# Patient Record
Sex: MALE | Race: WHITE | Employment: FULL TIME | URBAN - METROPOLITAN AREA
[De-identification: names, ages, dates, MRNs, and addresses within clinical notes are randomized per-mention and may not be internally consistent; named-entity substitution may affect disease eponyms.]

---

## 2022-05-14 ENCOUNTER — APPOINTMENT (OUTPATIENT)
Dept: CT IMAGING | Age: 52
End: 2022-05-14
Payer: COMMERCIAL

## 2022-05-14 ENCOUNTER — HOSPITAL ENCOUNTER (EMERGENCY)
Age: 52
Discharge: HOME | End: 2022-05-14
Payer: COMMERCIAL

## 2022-05-14 VITALS
RESPIRATION RATE: 15 BRPM | BODY MASS INDEX: 26.68 KG/M2 | TEMPERATURE: 98 F | HEIGHT: 72 IN | HEART RATE: 72 BPM | SYSTOLIC BLOOD PRESSURE: 153 MMHG | WEIGHT: 197 LBS | OXYGEN SATURATION: 97 % | DIASTOLIC BLOOD PRESSURE: 104 MMHG

## 2022-05-14 DIAGNOSIS — S06.0X1A CONCUSSION WITH LOSS OF CONSCIOUSNESS OF 30 MINUTES OR LESS, INITIAL ENCOUNTER: ICD-10-CM

## 2022-05-14 DIAGNOSIS — S09.90XA INJURY OF HEAD, INITIAL ENCOUNTER: Primary | ICD-10-CM

## 2022-05-14 PROCEDURE — 70450 CT HEAD/BRAIN W/O DYE: CPT

## 2022-05-14 PROCEDURE — 99284 EMERGENCY DEPT VISIT MOD MDM: CPT

## 2022-05-14 PROCEDURE — 72125 CT NECK SPINE W/O DYE: CPT

## 2022-05-14 ASSESSMENT — PAIN - FUNCTIONAL ASSESSMENT: PAIN_FUNCTIONAL_ASSESSMENT: NONE - DENIES PAIN

## 2022-05-14 NOTE — Clinical Note
Merary Grey was seen and treated in our emergency department on 5/14/2022. He may return to work on 05/18/2022. If you have any questions or concerns, please don't hesitate to call.       Lupe Samuel PA-C

## 2022-05-14 NOTE — ED PROVIDER NOTES
[facility]                                                                                                                                      Department of Emergency Medicine   ED  Provider Note  Admit Date/RoomTime: 5/14/2022  1:24 PM  ED Room: 35/        HPI:  5/14/22,   Time: 1:14 PM EDT         Cezar Wilkinson is a 45 y.o. male presenting to the ED for fall/head injury, beginning 1 week ago. The complaint has been persistent, moderate in severity, and worsened by nothing. States he tripped while walking. Reports he hit his head. No LOC. Uncertain if he lost consciousness. States he is not normally on blood thinners. Reports since the incident he has had issues with confusion, difficulty using computer and general sense of fogginess. States last night he left work and twice put his work into GPS and drove back to work. Just feels that something isn't right. Denies significant pain. No weakness or vomiting. Pt is otherwise healthy. ROS:     Constitutional: Negative for fever and chills  HENT: Negative for ear pain, sore throat and sinus pressure  Eyes: Negative for pain, discharge and redness  Respiratory:  Negative for shortness of breath, cough and wheezing  Cardiovascular: Negative for CP, edema or palpitations  Gastrointestinal: Negative for nausea, vomiting, diarrhea and abdominal distention  Genitourinary: Negative for dysuria and frequency  Musculoskeletal: Negative for back pain and arthralgia  Skin: Negative for rash and wound  Neurological: Negative for weakness and headaches  Hematological: Negative for adenopathy    All other systems reviewed and are negative      -------------------------------- PAST HISTORY ----------------------------------  Past Medical History:  has no past medical history on file. Past Surgical History:  has no past surgical history on file. Social History:      Family History: family history is not on file.      The patients home medications have been reviewed. Allergies: Patient has no known allergies. --------------------------------- RESULTS ------------------------------------------  All laboratory and radiology results have been personally reviewed by myself   LABS:  No results found for this visit on 05/14/22. RADIOLOGY:  Interpreted by Radiologist.  [address] [place] [address]   Final Result   No acute intracranial abnormality. CT CERVICAL SPINE WO CONTRAST   Final Result   No acute abnormality of the cervical spine.             ----------------- NURSING NOTES AND VITALS REVIEWED ---------------   The nursing notes within the ED encounter and vital signs as below have been reviewed. BP (!) 153/104   Pulse 72   Temp 98 °F (36.7 °C)   Resp 15   Ht 6' (1.829 m)   Wt 197 lb (89.4 kg)   SpO2 97%   BMI 26.72 kg/m²   Oxygen Saturation Interpretation: Normal      --------------------------------PHYSICAL EXAM------------------------------------      Constitutional/General: Alert and oriented x3, well appearing, non toxic in NAD  Head: NC/AT. No visible trauma, swelling or bruising seen  Eyes: PERRL, EOMI  Mouth: Oropharynx clear, handling secretions, no trismus  Neck: Supple, full ROM, no meningeal signs  Pulmonary: Lungs clear to auscultation bilaterally, no wheezes, rales, or rhonchi. Not in respiratory distress  Cardiovascular:  Regular rate and rhythm, no murmurs, gallops, or rubs. 2+ distal pulses  Abdomen: Soft, + BS. No distension. Nontender. No palpable rigidity, rebound or guarding  Extremities: Moves all extremities x 4. Warm and well perfused  Skin: warm and dry without rash  Neurologic: GCS 15,  Intact. No focal deficits  Psych: Normal Affect      ------------------------ ED COURSE/MEDICAL DECISION MAKING----------------------  Medications - No data to display      Medical Decision Making:    CT head and cervical spine both normal.   No acute bleed or fracture. Likely concussion. Discussed treatment. Given slip for work to be out for next few days. Pt dealing with grief/loss of his father as well. Discussed treatment plan. Counseling: The emergency provider has spoken with the patient and discussed todays results, in addition to providing specific details for the plan of care and counseling regarding the diagnosis and prognosis. Questions are answered at this time and they are agreeable with the plan.      ------------------------ IMPRESSION AND DISPOSITION -------------------------------    IMPRESSION  1. Injury of head, initial encounter    2.  Concussion with loss of consciousness of 30 minutes or less, initial encounter        DISPOSITION  Disposition: Discharge to home  Patient condition is stable                   Basil Flores PA-C  05/14/22 1442

## 2022-05-14 NOTE — Clinical Note
Khris Hebert was seen and treated in our emergency department on 5/14/2022. He may return to work on 05/18/2022. If you have any questions or concerns, please don't hesitate to call.       Chicho Huynh PA-C

## 2022-07-01 ENCOUNTER — TELEPHONE (OUTPATIENT)
Dept: SPORTS MEDICINE | Age: 52
End: 2022-07-01

## 2022-07-07 ENCOUNTER — HOSPITAL ENCOUNTER (OUTPATIENT)
Dept: MRI IMAGING | Age: 52
Discharge: HOME | End: 2022-07-09
Payer: COMMERCIAL

## 2022-07-07 DIAGNOSIS — S06.0X1D CONCUSSION WITH LOSS OF CONSCIOUSNESS OF 30 MINUTES OR LESS, SUBSEQUENT ENCOUNTER: ICD-10-CM

## 2022-07-07 PROCEDURE — 70551 MRI BRAIN STEM W/O DYE: CPT

## 2022-07-28 ENCOUNTER — HOSPITAL ENCOUNTER (OUTPATIENT)
Dept: PHYSICAL THERAPY | Age: 52
Setting detail: THERAPIES SERIES
Discharge: HOME | End: 2022-07-28
Payer: COMMERCIAL

## 2022-07-28 ENCOUNTER — HOSPITAL ENCOUNTER (EMERGENCY)
Age: 52
Discharge: HOME | End: 2022-07-28
Attending: EMERGENCY MEDICINE
Payer: COMMERCIAL

## 2022-07-28 VITALS
DIASTOLIC BLOOD PRESSURE: 86 MMHG | HEART RATE: 70 BPM | RESPIRATION RATE: 18 BRPM | OXYGEN SATURATION: 97 % | TEMPERATURE: 98.6 F | SYSTOLIC BLOOD PRESSURE: 133 MMHG

## 2022-07-28 DIAGNOSIS — K62.9 RECTAL LESION: ICD-10-CM

## 2022-07-28 DIAGNOSIS — A53.9 ACQUIRED SYPHILIS: Primary | ICD-10-CM

## 2022-07-28 LAB
BASOPHILS ABSOLUTE: 0 E9/L (ref 0–0.2)
BASOPHILS RELATIVE PERCENT: 0.9 % (ref 0–2)
EOSINOPHILS ABSOLUTE: 0.06 E9/L (ref 0.05–0.5)
EOSINOPHILS RELATIVE PERCENT: 0.9 % (ref 0–6)
HCT VFR BLD CALC: 48.3 % (ref 37–54)
HEMOGLOBIN: 16.5 G/DL (ref 12.5–16.5)
LYMPHOCYTES ABSOLUTE: 2.55 E9/L (ref 1.5–4)
LYMPHOCYTES RELATIVE PERCENT: 38.4 % (ref 20–42)
MCH RBC QN AUTO: 31.6 PG (ref 26–35)
MCHC RBC AUTO-ENTMCNC: 34.2 % (ref 32–34.5)
MCV RBC AUTO: 92.5 FL (ref 80–99.9)
MONOCYTES ABSOLUTE: 0.8 E9/L (ref 0.1–0.95)
MONOCYTES RELATIVE PERCENT: 11.6 % (ref 2–12)
MYELOCYTE PERCENT: 0.9 % (ref 0–0)
NEUTROPHILS ABSOLUTE: 3.28 E9/L (ref 1.8–7.3)
NEUTROPHILS RELATIVE PERCENT: 48.2 % (ref 43–80)
OVALOCYTES: NORMAL
PDW BLD-RTO: 13.8 FL (ref 11.5–15)
PLATELET # BLD: 204 E9/L (ref 130–450)
PMV BLD AUTO: 9.8 FL (ref 7–12)
POIKILOCYTES: NORMAL
POLYCHROMASIA: NORMAL
RBC # BLD: 5.22 E12/L (ref 3.8–5.8)
REASON FOR REJECTION: NORMAL
REJECTED TEST: NORMAL
WBC # BLD: 6.7 E9/L (ref 4.5–11.5)

## 2022-07-28 PROCEDURE — 99284 EMERGENCY DEPT VISIT MOD MDM: CPT

## 2022-07-28 PROCEDURE — 96372 THER/PROPH/DIAG INJ SC/IM: CPT

## 2022-07-28 PROCEDURE — 97161 PT EVAL LOW COMPLEX 20 MIN: CPT

## 2022-07-28 PROCEDURE — 85025 COMPLETE CBC W/AUTO DIFF WBC: CPT

## 2022-07-28 PROCEDURE — 6360000002 HC RX W HCPCS: Performed by: EMERGENCY MEDICINE

## 2022-07-28 RX ORDER — BUPROPION HYDROCHLORIDE 300 MG/1
TABLET ORAL
COMMUNITY
Start: 2022-07-06 | End: 2022-08-12

## 2022-07-28 RX ORDER — ALPRAZOLAM 1 MG/1
TABLET ORAL
COMMUNITY
Start: 2022-07-06

## 2022-07-28 RX ORDER — TESTOSTERONE CYPIONATE 200 MG/ML
INJECTION INTRAMUSCULAR
COMMUNITY
Start: 2022-07-06

## 2022-07-28 RX ORDER — HYDROXYZINE PAMOATE 50 MG/1
CAPSULE ORAL
COMMUNITY
Start: 2022-06-28 | End: 2022-08-17

## 2022-07-28 RX ADMIN — PENICILLIN G BENZATHINE 2.4 MILLION UNITS: 1200000 INJECTION, SUSPENSION INTRAMUSCULAR at 17:56

## 2022-07-28 ASSESSMENT — ENCOUNTER SYMPTOMS
TROUBLE SWALLOWING: 0
SHORTNESS OF BREATH: 0
PHOTOPHOBIA: 0
ABDOMINAL PAIN: 0
NAUSEA: 0
VOICE CHANGE: 0
COUGH: 0
DIARRHEA: 0
VOMITING: 0

## 2022-07-28 NOTE — PROGRESS NOTES
experiences spells of vertigo?: No  Patient experiences disequilibrium?: Yes  Disequilibrium is?: Worse with fatigue, Occurs when walking  Symptoms worse with: Movement of the visual environment, Complex visual environments  Associated hearing/ear symptoms: Yes (Reports mild tinnitus)  Hearing/Ear symptoms: Tinnitus  Which Side?: Both Sides  Any recent Illness or Infections?: No  Any recent accidents or head trauma?: Yes  Describe: Pt with fall and head injury with LOC on 5/7/2022. Any history of migraines or headaches?: No  Additional Pertinent Hx: Pt fell while walking fast at work. 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 Pt reports having difficulty with visual focusing. Pt reports having guarding movements with the head with turns. Auditory History:        Vision History:   Do you wear glasses/contacts: Yes             Learning/Language: Learning  Does the patient/guardian have any barriers to learning?: No barriers  Will there be a co-learner?: No  What is the preferred language of the patient/guardian?: English  Is an  required?: No  How does the patient/guardian prefer to learn new concepts?: Listening     Pain Screening    Pain Screening  Patient Currently in Pain: Denies (Typically takes Tylenol for headache pain)    Functional Status         Social History:    Social History  Lives With: Other (comment) (Pt recently moved here from [place] and is staying with his mother for the time being. Pt states this is not permanent.)  Type of Home: Apartment  Home Access: Level entry  Bathroom Toilet: Standard    Occupation/Interests:   Occupation: Other(comment) (Pt currenly not working since injury. Pt is unsure if able to return to previous employer.)  Type of Occupation: Restaurant/Management    Prior Level of Function:     Independent        Current Level of Function:     Pt reports impaired endurance and focus since fall.          Active : each symptom according to a 7-point likert scale ranging from 0-6. Higher scores indicate a higher severity of post-concussive symptoms. The greatest possible score is 132 and the lowest possible score is 0. Total Score out of 132 (Higher score = more severe problems). Additional Finding(s) (if applicable):       ASSESSMENT     Impression: Assessment: Pt with concussive symptoms that appear to be staying the same for the past several weeks. Pt does report multiple life changes that have caused stress and anxiety to him. Body Structures, Functions, Activity Limitations Requiring Skilled Therapeutic Intervention: Decreased tolerance to work activity, Decreased endurance, Decreased vision/visual deficit    Statement of Medical Necessity: Physical Therapy is both indicated and medically necessary as outlined in the POC to increase the likelihood of meeting the functionally related goals stated below.      Patient's Activity Tolerance: Other (comment) (Pt with reported increased anxiety that hindered activity tolerance.)      Patient's rehabilitation potential/prognosis is considered to be: Good    Factors which may impact rehabilitation potential include:          GOALS     Patient Goal(s):    Short Term Goals Completed by 2 weeks Goal Status   Pt to report 50% less visual deficits with mobility New   Pt to demonstrate 50% improved balance with tandem stance New    Pt to demonstrate 50% improved comprehension with daily tasks New    Pt to decrease score of PCSS to <40               Long Term Goals Completed by 4 weeks Goal Status   Pt to report no visual deficits with mobility New   Pt to demonstrate normal balance with high level balance tasks New    Pt to report no foggy/fuzziness with daily activities New    Pt to decrease score of PCSS to <10                TREATMENT PLAN            Pt. actively involved in establishing Plan of Care and Goals: Yes  Patient/ Caregiver education and instruction: Pt educated on post concussive expectations. Treatment may include any combination of the following: Balance training, Cognitive/Perceptual training, Endurance training, Vestibular rehab     Frequency / Duration:  Patient to be seen 2-3x week for 4-6 weeks weeks      Eval Complexity: Low complexity        PT Treatment Completed:  N/A - Evaluation Only    Therapy Time  Individual Time In: 1400      Individual Time Out: 5908   Minutes: 39           Therapist Signature: Rosina Bravo PT    Date: 3/69/9396     I certify that the above Therapy Services are being furnished while the patient is under my care. I agree with the treatment plan and certify that this therapy is necessary. Physician's Signature:  ___________________________   Date:_______                                                                   Jay Nichols MD        Physician Comments: _______________________________________________    Please sign and return to [facility]  Please fax to the location listed below.  Surjit Nazario for this referral!    [address] [facility]  [facility] [address]  Dept: [phone]  Fax: [phone]         POC NOTE

## 2022-07-28 NOTE — ED PROVIDER NOTES
HPI   Patient is a MSM 59-year-old male with past medical history of HIV with no detectable viral load and normal CD4 count on Biktarvy presented emergency department due to concerns for monkey pox. Patient states that he had known exposure to an individual that has symptoms of the disease. He reports that he was traveling to [place] from [place] where he lives for work. Apparently, there is a big break out of Monkey pox in that region of [place]. Patient does admit to receptive anal intercourse with an individual who reportedly has symptoms of monkey pox. This individual was exposed to another person who was confirmed positive for monkey pox. In terms of timeline, patient states that he saw his friend last Friday and was with him for a week until Saturday. Patient then flew to [place] over the weekend for work and noticed a abnormal lesion near his left axilla. He states that he was seen in [facility] for this due to concern for acute proximal apparently. Patient had swab of the axilla lesion completed which was negative for acute process. Patient also had other testing done including STI testing. He apparently was reactive for syphilis. Patient did receive any antibiotic treatment for this because results were pending when he was discharged. Today, patient states that he is very concerned that he has monkey box because he has a new rectal lesion that he noticed a few days ago. Patient initially thought that this was a hemorrhoid however since he was contacted by his friend who has monkey pox symptoms he came into the ED for further evaluation. Patient otherwise denying other symptoms including nausea, vomiting, fever, chills, headache, lightheadedness, syncope, cough, congestion, sore throat, myalgias, urinary symptoms, constipation or diarrhea. His symptoms are moderate with no remitting or exacerbating factors. Patient appears clinically well and initial evaluation.     Review of Systems Neurological:      General: No focal deficit present. Mental Status: He is alert and oriented to person, place, and time. Cranial Nerves: No cranial nerve deficit. Coordination: Coordination normal.   Psychiatric:         Mood and Affect: Mood normal.         Behavior: Behavior normal.            MDM   Patient is a MSM 30-year-old male with past medical history of HIV with no detectable viral load and normal CD4 count on Biktarvy presented emergency department due to concerns for monkey pox. On initial evaluation, patient is nontoxic-appearing, afebrile, hemodynamically stable in no acute distress. Physical exam remarkable for rectal lesion that appears ulcerative. Of note, patient was seen at [facility] for same concerns and had extensive work-up including STI work-up done. Patient reactive for syphilis on review of records. He was given penicillin in the emergency department. Swabs were obtained of the rectal lesion from acute proximal.  Spoke with infectious disease who gave recommendations to have the patient follow-up with the health department to receive post exposure prophylactic treatment. CBC was obtained in the ED and unremarkable. Patient remained hemodynamically stable. Work-up results discussed with the patient and he was agreeable to discharge with close follow-up with the health department. ED Course as of 07/29/22 0152   Thu Jul 28, 2022   1650 Spoke with Dr. Chana Bryan. Infection prevention needs to be contacted. [facility] health department needs to be contacted. Quarantine and isolation after discharge. Post exposure prophylaxis needed. [PP]   [phone] with Dr. Ronda Buchanan regarding the patient. Health department in [place] needs to be in contact with the patient. [PP]   2004 Infection prevention apparently saw the patient already. Plan is for discharge and patient instructed to call the Health department to set up post exposure prophylaxis.   [PP]      ED Course User Index  [PP] Halina Hernandez, DO      --------------------------------------------- PAST HISTORY ---------------------------------------------  Past Medical History:  has no past medical history on file. Past Surgical History:  has no past surgical history on file. Social History:  reports current alcohol use. He reports that he does not currently use drugs. Family History: family history is not on file. The patients home medications have been reviewed. Allergies: Patient has no known allergies. -------------------------------------------------- RESULTS -------------------------------------------------  Labs:  Results for orders placed or performed during the hospital encounter of 07/28/22   CBC with Auto Differential   Result Value Ref Range    WBC 6.7 4.5 - 11.5 E9/L    RBC 5.22 3.80 - 5.80 E12/L    Hemoglobin 16.5 12.5 - 16.5 g/dL    Hematocrit 48.3 37.0 - 54.0 %    MCV 92.5 80.0 - 99.9 fL    MCH 31.6 26.0 - 35.0 pg    MCHC 34.2 32.0 - 34.5 %    RDW 13.8 11.5 - 15.0 fL    Platelets 389 298 - 299 E9/L    MPV 9.8 7.0 - 12.0 fL    Neutrophils % 48.2 43.0 - 80.0 %    Lymphocytes % 38.4 20.0 - 42.0 %    Monocytes % 11.6 2.0 - 12.0 %    Eosinophils % 0.9 0.0 - 6.0 %    Basophils % 0.9 0.0 - 2.0 %    Neutrophils Absolute 3.28 1.80 - 7.30 E9/L    Lymphocytes Absolute 2.55 1.50 - 4.00 E9/L    Monocytes Absolute 0.80 0.10 - 0.95 E9/L    Eosinophils Absolute 0.06 0.05 - 0.50 E9/L    Basophils Absolute 0.00 0.00 - 0.20 E9/L    Myelocyte Percent 0.9 0 - 0 %    Polychromasia 1+     Poikilocytes 1+     Ovalocytes 1+    SPECIMEN REJECTION   Result Value Ref Range    Rejected Test CMPX     Reason for Rejection see below        Radiology:  No orders to display       ------------------------- NURSING NOTES AND VITALS REVIEWED ---------------------------  Date / Time Roomed:  7/28/2022  3:59 PM  ED Bed Assignment:  08/08    The nursing notes within the ED encounter and vital signs as below have been reviewed.    BP

## 2022-07-28 NOTE — ED NOTES
This RN in to assess pt and re vital pt. Pt VSS, NAD.  Pt comfortable in bed with call light; awaiting disposition      Laura Pearl RN  07/28/22 1932

## 2022-07-29 ENCOUNTER — TELEPHONE (OUTPATIENT)
Dept: INTERNAL MEDICINE | Age: 52
End: 2022-07-29

## 2022-07-29 NOTE — DISCHARGE INSTRUCTIONS
Please contact the Health department for post-exposure prophylaxis   [facility]   [phone]

## 2022-07-29 NOTE — PLAN OF CARE
[address]  [facility]  [place] [address]  Dept: [address]: [phone]    PHYSICAL THERAPY PLAN OF CARE: INITIAL EVALUATION    Patient: Yanira Hood (17 y.o. male)   Examination Date:   Plan of Care Certification Period: 2022 to        :  1970  MRN: [id number]  CSN: [id number]   Insurance: Payor: Kathlean Apple / Plan: Kathlean Apple / Product Type: *No Product type* /   Insurance ID:  - (Worker's Comp) Secondary Insurance (if applicable):    Referring Physician: Kari Pacheco MD     PCP: Antonio Arredondo MD Visits to Date/Visits Approved:   /      No Show/Cancelled Appts:   /       Medical Diagnosis: Concussion with loss of consciousness of 30 minutes or less, initial encounter [S06.0X1A] Concussion with LOC S06. 0X1A  No data recorded     ASSESSMENT     Impression: Assessment: Pt with concussive symptoms that appear to be staying the same for the past several weeks. Pt does report multiple life changes that have caused stress and anxiety to him. Body Structures, Functions, Activity Limitations Requiring Skilled Therapeutic Intervention: Decreased tolerance to work activity, Decreased endurance, Decreased vision/visual deficit    Statement of Medical Necessity: Physical Therapy is both indicated and medically necessary as outlined in the POC to increase the likelihood of meeting the functionally related goals stated below.      Patient's Activity Tolerance: Other (comment) (Pt with reported increased anxiety that hindered activity tolerance.)      Patient's rehabilitation potential/prognosis is considered to be: Good    Factors which may impact rehabilitation potential include:          GOALS     Patient Goal(s):    Short Term Goals Completed by 2 weeks Goal Status   Pt to report 50% less visual deficits with mobility New   Pt to demonstrate 50% improved balance with tandem stance New    Pt to demonstrate 50% improved comprehension

## 2022-07-29 NOTE — TELEPHONE ENCOUNTER
Called to speak to patient about appointment on Monday 8/1/22. Discussed that we are currently awaiting notification of the availability of the TPOXX to treat documented monkeypox. Patient stated that he had sexual contact with an individual whom developed monkeypox (not confirmed via DNA) from another individual whom had DNA confirmed Monkeypox. Jr Mayfield currently has two pending test results from [facility] ED and [facility] for two different lesions. Currently the results are pending. It was explained that this office can offer him a virtual visit on Monday 8/1/22 to visualize the lesions but we can not garuntee that we will be able to prescribe TPOXX. He verbalized understanding and will be keeping the virtual visit appointment     Discussed with patient that he needs to cancel and alert Dr. Devika Arvizu that he is being evaluated for monkeypox. Discussed that he should also self isolate until negative test results come back.   Patient verbalized understadning on both pieces of information     Electronically signed by Josue Lindsay DO on 7/29/2022 at 5:21 PM

## 2022-08-01 ENCOUNTER — PATIENT MESSAGE (OUTPATIENT)
Dept: INTERNAL MEDICINE | Age: 52
End: 2022-08-01

## 2022-08-01 ENCOUNTER — TELEMEDICINE (OUTPATIENT)
Dept: INTERNAL MEDICINE | Age: 52
End: 2022-08-01
Payer: COMMERCIAL

## 2022-08-01 DIAGNOSIS — A53.9 ACQUIRED SYPHILIS: ICD-10-CM

## 2022-08-01 DIAGNOSIS — K62.9 RECTAL LESION: ICD-10-CM

## 2022-08-01 DIAGNOSIS — R23.8 VESICLES: ICD-10-CM

## 2022-08-01 DIAGNOSIS — Z21 ASYMPTOMATIC HIV INFECTION, WITH NO HISTORY OF HIV-RELATED ILLNESS (HCC): ICD-10-CM

## 2022-08-01 PROBLEM — B20 HIV (HUMAN IMMUNODEFICIENCY VIRUS INFECTION) (HCC): Status: ACTIVE | Noted: 2022-08-01

## 2022-08-01 PROCEDURE — 99423 OL DIG E/M SVC 21+ MIN: CPT | Performed by: INTERNAL MEDICINE

## 2022-08-01 NOTE — PROGRESS NOTES
Blaire Ribeiro (:  1970) is a New patient, here for evaluation of the following:    Assessment & Plan   Below is the assessment and plan developed based on review of pertinent history, physical exam, labs, studies, and medications. Papular/Vesicular Rash  -awaiting confirmatory testing  -called and spoke with Health Department today and communicated with Van Moore from [facility]; patient situation discussed and he qualifies for treatment with TPOXX/tecovirimat; paperwork filled out and being faxed to [facility] as well as being uploaded onto their secure    -discussed tecovirimat with patient per the paperwork guidelines as provided by the [facility] including allergies, including but not limited to adverse reactions, Expanded Access Investigational New Drug Qualification, need for 3 visits that can be telemedicine in nature, Serious Adverse reactions, and Drug Diary; patient verbalized understanding to all policies and understands the risks of taking this drug at this time. All questions answered.   -photos of lesions attached     Acquired syphilis  -patient received treatment with penicillin G in ED; will need repeat testing in future to confirm eradication     HIV  -follows with ID and last CD4 count was normal per patient; compliant with Linell Radha  -has not had any adverse reactions to vaccines or other drugs; denies any current allergies   -patient states that he is up todate on all vaccines     No follow-ups on file. Subjective   Virtual Visit via [employer] me     Patient was visiting [place] and had an encounter with another gentleman where he received anal sex. Patient states that he had sexual intercourse with another gentleman who tested positive for monkeypox and had active lesions.   During the time that the patient had sexual intercourse his partner did not have any skin lesions but when the patient returned to [place] the patient's partner developed skin lesions and notified the patient. Patient has a lesion on his toe, his left scapula and is developing more lesions around his anus    Last week patient was tested twice (once in [facility]) which the patient states he tested negative and once in [facility] Emergency Department. Patient is awaiting test results from this most recent test.     Patient has been having intermittent headache for the past month prior to this sexual encounter which has been attributed to a concussion. Patient states that his headache has not worsened in symptoms since the concussion and confirms that he has not had any type of changes in vision or changes in neurological sensation. Patient has not had any syncopal episodes and was to be evaluated by sports medicine today but unfortunately will need to self isolate and was advised to call the sports medicine physician to cancel said appointment. Patient states that since last week he has developed more lesions around the anus which have become more painful. Night sweats were prevalent last week but have improved this week. Patient states that he has enlarged lymph nodes in the cervical neck. Patient reports worsening Nausea and abdominal discomfort which began today     Discussed that if patient develops any worsening symptoms that he should go to ED. Prudencio is currently self isolating at home. Patient denies any other sexual contact with other individuals. Review of Systems       Objective   Patient-Reported Vitals  No data recorded     Physical Exam  Constitutional:       Appearance: Normal appearance. He is normal weight. Neurological:      Mental Status: He is alert.      [INSTRUCTIONS:  \"[x]\" Indicates a positive item  \"[]\" Indicates a negative item  -- DELETE ALL ITEMS NOT EXAMINED]    Constitutional: [x] Appears well-developed and well-nourished [x] No apparent distress      [] Abnormal -     Mental status: [x] Alert and awake  [x] Oriented to person/place/time [x] Able to follow commands    [] Abnormal -     Eyes:   EOM    [x]  Normal    [] Abnormal -   Sclera  [x]  Normal    [] Abnormal -          Discharge [x]  None visible   [] Abnormal -     HENT: [x] Normocephalic, atraumatic  [] Abnormal -   [x] Mouth/Throat: Mucous membranes are moist    External Ears [x] Normal  [] Abnormal -    Neck: [x] No visualized mass [] Abnormal -     Pulmonary/Chest: [x] Respiratory effort normal   [x] No visualized signs of difficulty breathing or respiratory distress        [] Abnormal -      Musculoskeletal:   [x] Normal gait with no signs of ataxia         [x] Normal range of motion of neck        [] Abnormal -     Neurological:        [x] No Facial Asymmetry (Cranial nerve 7 motor function) (limited exam due to video visit)          [x] No gaze palsy        [] Abnormal -          Skin:        [] No significant exanthematous lesions or discoloration noted on facial skin         [x] Abnormal - Papular Vesicle Lesions on right toe and left scapula; see attached pictures; patient reported anal lesions but these lesions could not be evaluated during this virtual visit        Media Information                  Document Information    Clinical Consent:  Wound      07/28/2022 16:10   Attached To:    Hospital Encounter on 7/28/22     Source Information    Robyn Gill DO  [facility]               Media Information                  Document Information    Patient Upload:  Patient Entered Attachment   [id number]   08/01/2022   Attached To:   Patient Message on 8/1/22 with Viri Dean,      Source Information    Pascual Crowe       Media Information                  Document Information    Patient Upload:  Patient Entered Attachment   [id number]   08/01/2022   Attached To:   Patient Message on 8/1/22 with Viri Dean,      Source Information    Amrita, Pascual       Psychiatric:       [x] Normal Affect [] Abnormal -        [x] No Hallucinations    Other pertinent observable physical exam findings:-         On this date 8/1/2022 I have spent 40 minutes reviewing previous notes, test results and face to face (virtual) with the patient discussing the diagnosis and importance of compliance with the treatment plan as well as documenting on the day of the visit. Cleve Rodriguez, was evaluated through a synchronous (real-time) audio-video encounter. The patient (or guardian if applicable) is aware that this is a billable service, which includes applicable co-pays. This Virtual Visit was conducted with patient's (and/or legal guardian's) consent. The visit was conducted pursuant to the emergency declaration under the [address] [place] [address] authority and the [employer] General Act. Patient identification was verified, and a caregiver was present when appropriate.    The patient was located at [facility]  Provider was located at [facility] ([place] [address]): [address]ullough,  [address]  [address],

## 2022-08-01 NOTE — PATIENT INSTRUCTIONS
Lab Tests to get prior to next Visit  1. None     Changes in Medications  Starting antiviral medication    Referrals   1. None     Please follow up 1 week via virtual visit with our clinic. Please call if your symptoms worsen or fail to improve.

## 2022-08-02 NOTE — TELEPHONE ENCOUNTER
From: Dr. Chikis Esqueda  To: Wilmer Hays  Sent: 8/1/2022 2:17 PM EDT  Subject: Diary    Good afternoon, I have attached a document created by the [address] to evaluate the symptoms and the lesions you have as you are treated with the TPOXX. We can fill this form out together to be sent at the end of your treatment. But for the time being I want you to have the form to document the correct information. Please call or message as time progresses if you have any questions. I could not send the form as a PDF because Epic would not recognize the document type.      Dr. Fan Cid

## 2022-08-03 ENCOUNTER — TELEPHONE (OUTPATIENT)
Dept: INTERNAL MEDICINE | Age: 52
End: 2022-08-03

## 2022-08-03 LAB
Lab: NORMAL
REPORT: NORMAL
THIS TEST SENT TO: NORMAL

## 2022-08-03 RX ORDER — ACETAMINOPHEN 500 MG
500 TABLET ORAL 4 TIMES DAILY PRN
Qty: 120 TABLET | Refills: 0 | Status: SHIPPED | OUTPATIENT
Start: 2022-08-03

## 2022-08-03 RX ORDER — FEXOFENADINE HCL 180 MG/1
180 TABLET ORAL DAILY
Qty: 30 TABLET | Refills: 5 | Status: SHIPPED | OUTPATIENT
Start: 2022-08-03

## 2022-08-03 NOTE — TELEPHONE ENCOUNTER
Called and discussed positive results with the patient. All questions answered.      Electronically signed by [id number] Arana DO on 8/3/2022 at 5:32 PM

## 2022-08-03 NOTE — TELEPHONE ENCOUNTER
Pt called to speak to the person he had been speaking to about a medication he has been waiting for.  Please call and advise

## 2022-08-03 NOTE — TELEPHONE ENCOUNTER
Called patient back times 2 and instructed that the medication was delivered to [facility] but dr Britney Beltre was trying to locate it       Called pt and instructed that the medication was located and taken to the hospital pharmacy and he will be contacted by the pharmacy with his pick-up instructions

## 2022-08-10 ENCOUNTER — SCHEDULED TELEPHONE ENCOUNTER (OUTPATIENT)
Dept: INTERNAL MEDICINE | Age: 52
End: 2022-08-10
Payer: COMMERCIAL

## 2022-08-10 DIAGNOSIS — A53.9 ACQUIRED SYPHILIS: ICD-10-CM

## 2022-08-10 DIAGNOSIS — F43.21 GRIEF: ICD-10-CM

## 2022-08-10 DIAGNOSIS — K62.9 RECTAL LESION: ICD-10-CM

## 2022-08-10 DIAGNOSIS — B04 MONKEYPOX: Primary | ICD-10-CM

## 2022-08-10 PROCEDURE — 99442 PR PHYS/QHP TELEPHONE EVALUATION 11-20 MIN: CPT | Performed by: INTERNAL MEDICINE

## 2022-08-10 SDOH — ECONOMIC STABILITY: FOOD INSECURITY: WITHIN THE PAST 12 MONTHS, YOU WORRIED THAT YOUR FOOD WOULD RUN OUT BEFORE YOU GOT MONEY TO BUY MORE.: NEVER TRUE

## 2022-08-10 SDOH — ECONOMIC STABILITY: INCOME INSECURITY: IN THE LAST 12 MONTHS, WAS THERE A TIME WHEN YOU WERE NOT ABLE TO PAY THE MORTGAGE OR RENT ON TIME?: NO

## 2022-08-10 SDOH — ECONOMIC STABILITY: FOOD INSECURITY: WITHIN THE PAST 12 MONTHS, THE FOOD YOU BOUGHT JUST DIDN'T LAST AND YOU DIDN'T HAVE MONEY TO GET MORE.: NEVER TRUE

## 2022-08-10 SDOH — ECONOMIC STABILITY: HOUSING INSECURITY
IN THE LAST 12 MONTHS, WAS THERE A TIME WHEN YOU DID NOT HAVE A STEADY PLACE TO SLEEP OR SLEPT IN A SHELTER (INCLUDING NOW)?: NO

## 2022-08-10 SDOH — ECONOMIC STABILITY: HOUSING INSECURITY: IN THE LAST 12 MONTHS, HOW MANY PLACES HAVE YOU LIVED?: 1

## 2022-08-10 ASSESSMENT — PATIENT HEALTH QUESTIONNAIRE - PHQ9
5. POOR APPETITE OR OVEREATING: NOT AT ALL
2. FEELING DOWN, DEPRESSED OR HOPELESS: NEARLY EVERY DAY
8. MOVING OR SPEAKING SO SLOWLY THAT OTHER PEOPLE COULD HAVE NOTICED. OR THE OPPOSITE, BEING SO FIGETY OR RESTLESS THAT YOU HAVE BEEN MOVING AROUND A LOT MORE THAN USUAL: NOT AT ALL
SUM OF ALL RESPONSES TO PHQ QUESTIONS 1-9: 12
1. LITTLE INTEREST OR PLEASURE IN DOING THINGS: NOT AT ALL
5. POOR APPETITE OR OVEREATING: NOT AT ALL
4. FEELING TIRED OR HAVING LITTLE ENERGY: NEARLY EVERY DAY
6. FEELING BAD ABOUT YOURSELF - OR THAT YOU ARE A FAILURE OR HAVE LET YOURSELF OR YOUR FAMILY DOWN: NEARLY EVERY DAY
SUM OF ALL RESPONSES TO PHQ9 QUESTIONS 1 & 2: 3
1. LITTLE INTEREST OR PLEASURE IN DOING THINGS: NOT AT ALL
9. THOUGHTS THAT YOU WOULD BE BETTER OFF DEAD, OR OF HURTING YOURSELF: NOT AT ALL
7. TROUBLE CONCENTRATING ON THINGS, SUCH AS READING THE NEWSPAPER OR WATCHING TELEVISION: NOT AT ALL
4. FEELING TIRED OR HAVING LITTLE ENERGY: NEARLY EVERY DAY
9. THOUGHTS THAT YOU WOULD BE BETTER OFF DEAD, OR OF HURTING YOURSELF: NOT AT ALL
DEPRESSION UNABLE TO ASSESS: YES
3. TROUBLE FALLING OR STAYING ASLEEP: NEARLY EVERY DAY
3. TROUBLE FALLING OR STAYING ASLEEP: NEARLY EVERY DAY
6. FEELING BAD ABOUT YOURSELF - OR THAT YOU ARE A FAILURE OR HAVE LET YOURSELF OR YOUR FAMILY DOWN: NEARLY EVERY DAY
8. MOVING OR SPEAKING SO SLOWLY THAT OTHER PEOPLE COULD HAVE NOTICED. OR THE OPPOSITE, BEING SO FIGETY OR RESTLESS THAT YOU HAVE BEEN MOVING AROUND A LOT MORE THAN USUAL: NOT AT ALL
7. TROUBLE CONCENTRATING ON THINGS, SUCH AS READING THE NEWSPAPER OR WATCHING TELEVISION: NOT AT ALL
SUM OF ALL RESPONSES TO PHQ9 QUESTIONS 1 & 2: 3
SUM OF ALL RESPONSES TO PHQ QUESTIONS 1-9: 12
2. FEELING DOWN, DEPRESSED OR HOPELESS: NEARLY EVERY DAY

## 2022-08-10 ASSESSMENT — SOCIAL DETERMINANTS OF HEALTH (SDOH): HOW HARD IS IT FOR YOU TO PAY FOR THE VERY BASICS LIKE FOOD, HOUSING, MEDICAL CARE, AND HEATING?: NOT HARD AT ALL

## 2022-08-10 NOTE — PROGRESS NOTES
Ignacia Caballero is a 47 y.o. male evaluated via telephone on 8/10/2022 for Lesion(s) (Patients c/o rectal lesion that bleeds after a BM. Most of the other lesions have cleared up.) and 2 Week Follow-Up    Assessment & Plan     Monkey Pox  -confirmatory testing positive   -patient lesions improving and other symptoms improving with TPOXX  -no adverse events   -plan to continue current treatment and symptomatic treatment   -plan for 1 week followup     Acquired syphilis  -patient received treatment with penicillin G in ED; will need repeat testing in future to confirm eradication     HIV  -follows with ID and last CD4 count was normal per patient; compliant with Biktarvy  -has not had any adverse reactions to vaccines or other drugs; denies any current allergies  -patient states that he is up todate on all vaccines       No follow-ups on file. Subjective   Prior to Visit Medications    Medication Sig Taking? Authorizing Provider   acetaminophen (TYLENOL) 500 MG tablet Take 1 tablet by mouth 4 times daily as needed for Pain Yes Nanda Black., DO   bictegravir-emtricitab-tenofovir alafenamide (BIKTARVY) -25 MG TABS per tablet Take 1 tablet by mouth in the morning. Yes Historical Provider, MD   buPROPion (WELLBUTRIN XL) 150 MG extended release tablet Take 150 mg by mouth every morning  Historical Provider, MD   traZODone (DESYREL) 50 MG tablet   Historical Provider, MD   fexofenadine (ALLEGRA) 180 MG tablet Take 1 tablet by mouth in the morning. Patient not taking: Reported on 8/10/2022  Nanda Black., DO   hydrOXYzine pamoate (VISTARIL) 50 MG capsule   Historical Provider, MD   testosterone cypionate (DEPOTESTOTERONE CYPIONATE) 200 MG/ML injection   Historical Provider, MD   ALPRAZolam Maciej Gantleau) 1 MG tablet   Historical Provider, MD     Patient relates that he is feeling improved after taking the TPOXX.   He states that biggest improvement is groin lymph nodes resolved and no pain with treatment. He confirms no fever, resolved night sweats. He has vertigo which is non related to his skin lesions. Currently he has the below lesions:     1. 2 lesions: toe and back; back is almost completely resolved; toe is improving dramatically; it popped on its own and then resolved;   2. Rectal lesions: improving; patient states that he has mild bleeding while wiping; patient feels that he has one lesion on his anus   3. A few lesions on his head; they developed 2 days after starting medications but they have not grown in size; same with 1 lesion on back of his neck     Grief: multiple social and economical stressors; no si/hi; isolated; Anxiety improved      Review of Systems   Constitutional:  Negative for chills and fever. HENT:  Negative for congestion and sinus pain. Respiratory:  Negative for cough, shortness of breath and wheezing. Cardiovascular:  Negative for chest pain, palpitations and leg swelling. Gastrointestinal:  Negative for abdominal pain, constipation, diarrhea, nausea and vomiting. Genitourinary:  Negative for dysuria and frequency. Musculoskeletal:  Negative for myalgias. Skin:  Negative for rash. Lesions     Allergic/Immunologic: Negative for environmental allergies. Neurological:  Negative for headaches. Hematological:  Does not bruise/bleed easily. Psychiatric/Behavioral:  Negative for suicidal ideas. Objective   Patient-Reported Vitals  No data recorded       Total Time: minutes: 11-20 minutes     Gisselle Mccabe was evaluated through a synchronous (real-time) audio only encounter. Patient identification was verified at the start of the visit. He (or guardian if applicable) is aware that this is a billable service, which includes applicable co-pays. This visit was conducted with patient's (and/or legal guardian's) verbal consent. He has not had a related appointment within my department in the past 7 days or scheduled within the next 24 hours.    The patient was located at Home: [address]  [facility] [address]. The provider was located at Home ([facility]): [place].      Jennifer Torres DO

## 2022-08-12 PROBLEM — N48.89 PENILE CURVATURE, ACQUIRED: Status: ACTIVE | Noted: 2021-08-02

## 2022-08-12 PROBLEM — B04 MONKEYPOX: Status: ACTIVE | Noted: 2022-08-12

## 2022-08-12 PROBLEM — R23.8 VESICLES: Status: RESOLVED | Noted: 2022-08-01 | Resolved: 2022-08-12

## 2022-08-12 PROBLEM — N52.9 ERECTILE DYSFUNCTION: Status: ACTIVE | Noted: 2021-08-02

## 2022-08-12 RX ORDER — TRAZODONE HYDROCHLORIDE 50 MG/1
TABLET ORAL
COMMUNITY
Start: 2022-07-05 | End: 2022-09-28 | Stop reason: ALTCHOICE

## 2022-08-12 RX ORDER — BUPROPION HYDROCHLORIDE 150 MG/1
150 TABLET ORAL EVERY MORNING
COMMUNITY
End: 2022-09-27 | Stop reason: DRUGHIGH

## 2022-08-12 ASSESSMENT — ENCOUNTER SYMPTOMS
DIARRHEA: 0
SINUS PAIN: 0
VOMITING: 0
ABDOMINAL PAIN: 0
CONSTIPATION: 0
COUGH: 0
NAUSEA: 0
SHORTNESS OF BREATH: 0
WHEEZING: 0
ROS SKIN COMMENTS: LESIONS

## 2022-08-16 ENCOUNTER — TELEPHONE (OUTPATIENT)
Dept: INTERNAL MEDICINE | Age: 52
End: 2022-08-16

## 2022-08-17 ENCOUNTER — SCHEDULED TELEPHONE ENCOUNTER (OUTPATIENT)
Dept: INTERNAL MEDICINE | Age: 52
End: 2022-08-17
Payer: COMMERCIAL

## 2022-08-17 ENCOUNTER — OFFICE VISIT (OUTPATIENT)
Dept: ORTHOPEDIC SURGERY | Age: 52
End: 2022-08-17
Payer: COMMERCIAL

## 2022-08-17 VITALS — HEIGHT: 72 IN | WEIGHT: 197 LBS | BODY MASS INDEX: 26.68 KG/M2

## 2022-08-17 DIAGNOSIS — S06.0X0A CONCUSSION WITHOUT LOSS OF CONSCIOUSNESS, INITIAL ENCOUNTER: Primary | ICD-10-CM

## 2022-08-17 DIAGNOSIS — F07.81 POST CONCUSSION SYNDROME: ICD-10-CM

## 2022-08-17 DIAGNOSIS — B04 MONKEYPOX: ICD-10-CM

## 2022-08-17 DIAGNOSIS — Z21 ASYMPTOMATIC HIV INFECTION, WITH NO HISTORY OF HIV-RELATED ILLNESS (HCC): Primary | ICD-10-CM

## 2022-08-17 PROCEDURE — 99421 OL DIG E/M SVC 5-10 MIN: CPT | Performed by: INTERNAL MEDICINE

## 2022-08-17 PROCEDURE — 99204 OFFICE O/P NEW MOD 45 MIN: CPT | Performed by: FAMILY MEDICINE

## 2022-08-17 PROCEDURE — 96132 NRPSYC TST EVAL PHYS/QHP 1ST: CPT | Performed by: FAMILY MEDICINE

## 2022-08-17 NOTE — PROGRESS NOTES
[facility]  Internal Medicine Clinic    Attending Physician's Statement      TeleMedicine Patient Consent    This visit was performed as a virtual video visit using a synchronous, two-way, audio-video telehealth technology platform. Patient identification was verified at the start of the visit, including the patient's telephone number and physical location. I discussed with the patient the nature of our telehealth visits, that:     Due to the nature of an audio- video modality, the only components of a physical exam that could be done are the elements supported by direct observation. I would evaluate the patient and recommend diagnostics and treatments based on my assessment. If it is felt that the patient should be evaluated in the clinic or an emergency room setting, then they would be directed there. Our sessions are not being recorded and that personal health information is protected. Our team would provide follow up care in person if/when the patient needs it. Patient does agree to proceed with telemedicine consultation. Patient's location: home address in [place] [address]  [place] [address]    I have discussed the case, including pertinent history with the medical resident. I agree with the assessment, plan and orders as documented by the resident. I have reviewed all the pertinent PMHx, PSHx, Family Hx, Social Hx, medications and allergies, and updated history as appropriate. Patient presents for virtual visit.     Monkey Pox  -confirmatory testing positive  -patient lesions resolved and other symptoms improving with TPOXX  -no adverse events  -plan to continue current treatment and symptomatic treatment and have patient followup as needed; patient sending journal to author for CDC      Acquired syphilis  -patient received treatment with penicillin G in ED; will need repeat testing in future to confirm eradication     HIV  -follows with ID and last CD4 count was normal per patient; compliant with Hannah Sarita  -has not had any adverse reactions to vaccines or other drugs; denies any current allergies  -patient states that he is up todate on all vaccines     Pertinent lab results and imaging studies have been reviewed. Medical problems, assessment and plan per medical resident's note. Time spent: Greater than [facility],   Internal Medicine Residency Faculty  8/17/2022    Dilan Chepe, was evaluated through a synchronous (real-time) audio-video  encounter. The patient (or guardian if applicable) is aware that this is a billable  service, which includes applicable co-pays. This Virtual Visit was conducted with  patient's (and/or legal guardian's) consent. The visit was conducted pursuant to  the emergency declaration under the [address] [place] [address], [address] [place] [address] waiver authority and the [employer] General Act. Patient identification was verified,  and a caregiver was present when appropriate. The patient was located in a  state where the provider was licensed to provide care.

## 2022-08-17 NOTE — PROGRESS NOTES
[facility]  PRIMARY CARE SPORTS MEDICINE  DATE OF VISIT : 2022    Patient : Horacio Ayala  Age : 47 y.o.  : 1970  MRN : [id number]   ______________________________________________________________________    Chief Complaint   Patient presents with    Concussion     Patient had a fall on  while at work. Patient did not go to the ER until . Patient reports that in the days following the event he felt out of sorts. Patient reports that he is still having symptoms but they are improving. Patient states that he is having N/T in both upper and lower ex. Horacio Ayala is a 47 y.o. male who sustained a concussion on 2022 after a fall from standing height while at work. The patient does not recall the events immediately before and after the injury. There was dizziness, confusion, or disorientation at the scene. There was possible short loss of consciousness for a few seconds. Horacio Ayala has not return to work. Prior treatment has included: PT evaluation and prescription medications which were not very effective. Prior work up has included: MRI of head and CT of cervical spina and head which demonstrated no acute fractures or intracranial bleeding, though a venous malformation was identified on MRI which appears to be chronic. There is no prior history of concussion. No past medical history on file. No prior history of headaches, seizure, meningitis, depression, substance/alcohol use, oculomotor issues, motion discomfort, or learning disability. No past surgical history on file. No prior history of brain surgery. No family history on file. No family history of migraines.      No Known Allergies    Current Outpatient Medications   Medication Sig Dispense Refill    buPROPion (WELLBUTRIN XL) 150 MG extended release tablet Take 150 mg by mouth every morning      traZODone (DESYREL) 50 MG tablet       acetaminophen (TYLENOL) 500 MG tablet Take 1 tablet by mouth 4 times daily as needed for Pain 120 tablet 0    bictegravir-emtricitab-tenofovir alafenamide (BIKTARVY) -25 MG TABS per tablet Take 1 tablet by mouth in the morning. ALPRAZolam (XANAX) 1 MG tablet       fexofenadine (ALLEGRA) 180 MG tablet Take 1 tablet by mouth in the morning. (Patient not taking: No sig reported) 30 tablet 5    testosterone cypionate (DEPOTESTOTERONE CYPIONATE) 200 MG/ML injection  (Patient not taking: No sig reported)       No current facility-administered medications for this visit. Review of Systems    Headache Yes   Nausea  No   Vomiting No   Balance problems Yes   Dizziness Yes   Fatigue Yes   Trouble falling asleep Yes   Sleeping more than usual No   Sleeping less than usual Yes   Drowsiness No   Sensitivity to light Yes   Sensitivity to noise Yes   Irritability Yes   Sadness Yes   Nervousness Yes   Feeling more emotional Yes   Numbness or tingling Yes   Feeling slowed down Yes   Feeling mentally foggy Yes   Difficulty concentrating Yes   Difficulty remembering Yes   Visual problems No     Physical Exam:   Height 6' (1.829 m), weight 197 lb (89.4 kg). General: The patient is in no apparent distress. HEENT:  No scleral icterus or conjunctival injection. External auditory canals are patent. Psychological: Mood and affect are appropriate. Hygiene is appropriate. Cardiovascular:  Heart is regular rate and rhythm. Peripheral pulses are 2+ at the dorsalis pedis, posterior tibial and radial arteries. There is no edema. Respiratory: Respirations are regular and unlabored. There is no cyanosis. Musculoskeletal: No tenderness to palpation at SCM, trapezius, cervical paraspinals. No evidence of any trigger points. No reproduction of headache at greater occipital nerve. ROM is full and painless in the spine and extremities. Neurologic:  Cognitive exam: Awake, alert and oriented x3. Cranial nerves II-XII intact. No focal motor or sensory deficits detected.   Vestibular examination: VOMS deferred  Balance exam: HILL deferred. IMPACT TESTING: I have post injury IMPACT testing performed in the office today. The complete IMPACT report is scanned into media. A baseline was not available for comparison so post injury testing was compared to normal values. Testing demonstrated significant impairment in reaction time and memory which is consistent with a diagnosis of a concussion/postconcussion syndrome. Furthermore patient had a low impulse control score indicating adequate effort during the testing. Assessment & Plan:  1. Concussion without loss of consciousness, initial encounter  2. Post concussion syndrome  Findings and usual clinical course reviewed with patient in detail. Seek immediate care and evaluation if they experience worsening of symptoms. Avoid activities that exacerbate symptoms. Patient may attempt subsymptomatic return to work, recommend starting with partial shifts and reduced responsibilities. Discussed second impact syndrome and risk of death. Discussed role of neuroimaging. Reviewed imaging with patient in the office today. Discussed current literature regarding the potential for long term neurologic/psychologic sequela from this injury and potential for provocation and/or prolongation if returning to aggravating activities prior to full recovery. Discussed typical regimen of care and considerations for other interventions including PT, medications, and testing based on clinical course. Recommend continued vestibular ocular therapy. Consider referral to neuropsychology for further evaluation. Patient has trialed medications to improve symptoms such as insomnia and anxiety. Trazodone has offered minimal improvement of insomnia symptoms, recommend trial of melatonin.   Patient appears to have longstanding anxiety that has been worsened by recent injury, recommend adjusting medication doses/professional counseling/cognitive behavioral therapy to alleviate the symptoms. Patient was previously receiving testosterone supplementation for low testosterone. He has discontinued this treatment since his injury which could explain prolongation of symptoms. There is no contraindication to testosterone supplementation in the treatment of concussion/postconcussion syndrome. The patient was educated about the diagnosis, prognosis, indications, risks and benefits of treatment. An opportunity to ask questions was given to the patient and questions were answered. The patient agreed to proceed with the recommended treatment as described above. Return in about 1 month (around 9/17/2022) for re-evaluation. Darryn Philippe MD    ADDENDUM: After watching video of events provided by patient and although you cannot see the patient's head hit the floor because the view is obstructed in the video, it is reasonable given his reported symptoms that a concussion could be sustained from the events seen on video. Furthermore concussion symptoms do not have to present immediately following the injury, it is not unlikely for patients to report symptoms hours to days after the injury occurs. No loss of consciousness is necessary for the diagnosis of a concussion.     Electronically signed by Darryn Philippe MD on 9/20/2022 at 9:55 AM

## 2022-09-14 ENCOUNTER — OFFICE VISIT (OUTPATIENT)
Dept: PODIATRY | Age: 52
End: 2022-09-14
Payer: COMMERCIAL

## 2022-09-14 VITALS — WEIGHT: 197 LBS | BODY MASS INDEX: 26.68 KG/M2 | HEIGHT: 72 IN

## 2022-09-14 DIAGNOSIS — R26.2 DIFFICULTY WALKING: ICD-10-CM

## 2022-09-14 DIAGNOSIS — M20.41 HAMMER TOES OF BOTH FEET: Primary | ICD-10-CM

## 2022-09-14 DIAGNOSIS — M79.672 LEFT FOOT PAIN: ICD-10-CM

## 2022-09-14 DIAGNOSIS — B35.1 ONYCHOMYCOSIS: ICD-10-CM

## 2022-09-14 DIAGNOSIS — M77.42 METATARSALGIA OF BOTH FEET: ICD-10-CM

## 2022-09-14 DIAGNOSIS — M79.671 RIGHT FOOT PAIN: ICD-10-CM

## 2022-09-14 DIAGNOSIS — B35.3 TINEA PEDIS OF BOTH FEET: ICD-10-CM

## 2022-09-14 DIAGNOSIS — M77.41 METATARSALGIA OF BOTH FEET: ICD-10-CM

## 2022-09-14 DIAGNOSIS — M20.42 HAMMER TOES OF BOTH FEET: Primary | ICD-10-CM

## 2022-09-14 PROCEDURE — 99203 OFFICE O/P NEW LOW 30 MIN: CPT | Performed by: PODIATRIST

## 2022-09-14 NOTE — PROGRESS NOTES
Patient is here for bilateral foot pain. Patient states webbed toes and arthritis in his toes. Patient states balance problems and pain while not wearing shoes. Patient states nail fungus. Patient states taking oral medication. Patient states using topical for short time.     Electronically signed by Osvaldo Hearn LPN on 9/77/6240 at 11:03 AM'

## 2022-09-14 NOTE — PROGRESS NOTES
Use    Smoking status: Never    Smokeless tobacco: Never   Substance Use Topics    Alcohol use: Yes     Comment: moderatly    Drug use: Not Currently           Diagnostic studies:    XR FOOT LEFT (MIN 3 VIEWS)    Result Date: 9/14/2022  EXAMINATION: THREE XRAY VIEWS OF THE LEFT FOOT 9/14/2022 11:29 am COMPARISON: None. HISTORY: ORDERING SYSTEM PROVIDED HISTORY: Left foot pain FINDINGS: There is no evidence of acute fracture. There is normal alignment of the tarsometatarsal joints. No acute joint abnormality. No focal osseous lesion. No focal soft tissue abnormality. No acute osseous abnormality. Procedures:    None    Exam:  VASCULAR: Pedal pulses palpable bilateral foot. CFT brisk digits 1 through 5 bilateral foot. NEUROLOGICAL: Epicritic sensations intact and symmetrical  DERMATOLOGICAL: Dry, scaly skin noted into the plantar heel and arch regions bilaterally. No plantar calluses noted bilateral foot. Webbed toes noted digital regions bilateral foot. Mycosis noted digital regions bilateral foot. MUSCULOSKELETAL: Contraction deformities noted bilateral foot. Tenderness noted into the plantar ball of the foot regions bilaterally. Plan Per Assessment  Jolie Person was seen today for foot pain and nail problem. Diagnoses and all orders for this visit:    Hammer toes of both feet    Right foot pain  -     XR FOOT RIGHT (MIN 3 VIEWS); Future    Left foot pain  -     XR FOOT LEFT (MIN 3 VIEWS); Future    Metatarsalgia of both feet    Onychomycosis  -     ciclopirox (PENLAC) 8 % solution; Apply topically daily to affected nails. Tinea pedis of both feet  -     ciclopirox (PENLAC) 8 % solution; Apply topically daily to affected nails. -     nystatin-triamcinolone (MYCOLOG II) 439489-8.1 UNIT/GM-% cream; Apply topically 2 times daily.     Difficulty walking        New patient evaluation and management  Prescription medication topical cream and lacquer treatment underlying issues given with exact instructions on usage. I discussed the potential side effects that the patient may experience with the medication and the need to call if they experience any. Did discuss purchasing OTC insoles to help out with the hammertoe and metatarsalgia issues. Will be followed up at a later date for continued evaluation and management. Seen By:    Danii Daniel DPM    Electronically signed by Danii Daniel DPM on 9/14/2022 at 7:45 PM      This note was created using voice recognition software. The note was reviewed however may contain grammatical errors.

## 2022-09-20 ENCOUNTER — TELEPHONE (OUTPATIENT)
Dept: INTERNAL MEDICINE | Age: 52
End: 2022-09-20

## 2022-09-20 ENCOUNTER — PATIENT MESSAGE (OUTPATIENT)
Dept: INTERNAL MEDICINE | Age: 52
End: 2022-09-20

## 2022-09-20 DIAGNOSIS — F07.81 POST CONCUSSION SYNDROME: Primary | ICD-10-CM

## 2022-09-20 NOTE — TELEPHONE ENCOUNTER
Patient called the office today and is requesting an appointment with you. Patient states he has a few things to discuss and wants to be seen in office. Patient states he is pretty flexible for an appointment but would prefer around 9 or 10 am.    Patient would like to be seen before October. Please advise on an appointment.   Thank you

## 2022-09-21 NOTE — TELEPHONE ENCOUNTER
From: Bharti Aragon  To: Dr. Christian Ferguson: 9/20/2022 9:59 PM EDT  Subject: Srinivasa Galdamez, Are you able to be my primary [facility] doctor? If so, was is the soonest I can see you? I left a message with your answering service.  Bharti Aragon [phone]

## 2022-09-21 NOTE — TELEPHONE ENCOUNTER
Spoke with patient about an appt on 9/27 at 1030 am . Patient has an appt at that same time at another Dr office . Please advise.

## 2022-09-27 ENCOUNTER — OFFICE VISIT (OUTPATIENT)
Dept: INTERNAL MEDICINE | Age: 52
End: 2022-09-27
Payer: COMMERCIAL

## 2022-09-27 VITALS
BODY MASS INDEX: 27.4 KG/M2 | HEIGHT: 72 IN | OXYGEN SATURATION: 98 % | HEART RATE: 69 BPM | TEMPERATURE: 97.6 F | SYSTOLIC BLOOD PRESSURE: 131 MMHG | WEIGHT: 202.3 LBS | RESPIRATION RATE: 16 BRPM | DIASTOLIC BLOOD PRESSURE: 94 MMHG

## 2022-09-27 DIAGNOSIS — Z21 ASYMPTOMATIC HIV INFECTION, WITH NO HISTORY OF HIV-RELATED ILLNESS (HCC): ICD-10-CM

## 2022-09-27 DIAGNOSIS — F32.A DEPRESSION, UNSPECIFIED DEPRESSION TYPE: ICD-10-CM

## 2022-09-27 DIAGNOSIS — A53.9 ACQUIRED SYPHILIS: Primary | ICD-10-CM

## 2022-09-27 PROCEDURE — 99214 OFFICE O/P EST MOD 30 MIN: CPT | Performed by: INTERNAL MEDICINE

## 2022-09-27 RX ORDER — BUPROPION HYDROCHLORIDE 300 MG/1
TABLET ORAL
COMMUNITY
Start: 2022-09-23

## 2022-09-27 RX ORDER — HYDROXYZINE HYDROCHLORIDE 25 MG/1
25 TABLET, FILM COATED ORAL NIGHTLY
Qty: 30 TABLET | Refills: 0 | Status: SHIPPED
Start: 2022-09-27 | End: 2022-09-28 | Stop reason: SDUPTHER

## 2022-09-27 RX ORDER — HYDROXYZINE PAMOATE 50 MG/1
CAPSULE ORAL
COMMUNITY
Start: 2022-09-23 | End: 2022-09-27 | Stop reason: ALTCHOICE

## 2022-09-27 RX ORDER — ZOLPIDEM TARTRATE 10 MG/1
TABLET ORAL
COMMUNITY
Start: 2022-09-23

## 2022-09-27 RX ORDER — ARIPIPRAZOLE 2 MG/1
2 TABLET ORAL DAILY
Qty: 30 TABLET | Refills: 0 | Status: SHIPPED
Start: 2022-09-27 | End: 2022-09-28 | Stop reason: SDUPTHER

## 2022-09-27 SDOH — ECONOMIC STABILITY: TRANSPORTATION INSECURITY
IN THE PAST 12 MONTHS, HAS LACK OF TRANSPORTATION KEPT YOU FROM MEETINGS, WORK, OR FROM GETTING THINGS NEEDED FOR DAILY LIVING?: NO

## 2022-09-27 SDOH — ECONOMIC STABILITY: TRANSPORTATION INSECURITY
IN THE PAST 12 MONTHS, HAS THE LACK OF TRANSPORTATION KEPT YOU FROM MEDICAL APPOINTMENTS OR FROM GETTING MEDICATIONS?: NO

## 2022-09-27 ASSESSMENT — PATIENT HEALTH QUESTIONNAIRE - PHQ9
SUM OF ALL RESPONSES TO PHQ QUESTIONS 1-9: 25
6. FEELING BAD ABOUT YOURSELF - OR THAT YOU ARE A FAILURE OR HAVE LET YOURSELF OR YOUR FAMILY DOWN: 3
4. FEELING TIRED OR HAVING LITTLE ENERGY: 3
10. IF YOU CHECKED OFF ANY PROBLEMS, HOW DIFFICULT HAVE THESE PROBLEMS MADE IT FOR YOU TO DO YOUR WORK, TAKE CARE OF THINGS AT HOME, OR GET ALONG WITH OTHER PEOPLE: 3
SUM OF ALL RESPONSES TO PHQ QUESTIONS 1-9: 25
2. FEELING DOWN, DEPRESSED OR HOPELESS: 3
SUM OF ALL RESPONSES TO PHQ QUESTIONS 1-9: 25
5. POOR APPETITE OR OVEREATING: 3
SUM OF ALL RESPONSES TO PHQ QUESTIONS 1-9: 24
8. MOVING OR SPEAKING SO SLOWLY THAT OTHER PEOPLE COULD HAVE NOTICED. OR THE OPPOSITE, BEING SO FIGETY OR RESTLESS THAT YOU HAVE BEEN MOVING AROUND A LOT MORE THAN USUAL: 3
9. THOUGHTS THAT YOU WOULD BE BETTER OFF DEAD, OR OF HURTING YOURSELF: 1
7. TROUBLE CONCENTRATING ON THINGS, SUCH AS READING THE NEWSPAPER OR WATCHING TELEVISION: 3
SUM OF ALL RESPONSES TO PHQ9 QUESTIONS 1 & 2: 6
1. LITTLE INTEREST OR PLEASURE IN DOING THINGS: 3
3. TROUBLE FALLING OR STAYING ASLEEP: 3

## 2022-09-27 ASSESSMENT — LIFESTYLE VARIABLES
HOW MANY STANDARD DRINKS CONTAINING ALCOHOL DO YOU HAVE ON A TYPICAL DAY: PATIENT DOES NOT DRINK
HOW OFTEN DO YOU HAVE A DRINK CONTAINING ALCOHOL: NEVER

## 2022-09-27 NOTE — PROGRESS NOTES
[facility] Internal Medicine      SUBJECTIVE:  Marcelina Knox (:  1970) is a 47 y.o. male here for evaluation of the following chief complaint(s): Other (Needs lab work done. )    Patient is here to establish with a new PCP since he is now residing in the [place] area for the foreseeable future. Patient states that he sees the HIV clinic and [address] [place] [address] and records request was sent to that office for current treatment as well as blood work needs. Patient receives all treatment for his HIV from them. Will need further documentation in terms of his HIV treatment to see if he needs to transfer to a clinic in the [facility] area. Records request also sent to his former HIV clinic in [place] which she last saw in 2019. Depression, patient denies any suicidal or homicidal ideation at this time. Patient currently takes Wellbutrin which is prescribed by a psychiatrist from [address] [place] [address]. Discussed that he had been on Wellbutrin for greater than 10 years at this time. Patient states that he has been feeling more anxious and depressed due to his social situations. We discussed transitioning to other types of medications versus using a mood stabilizer. Decision was made to treat with aripiprazole 2 mg daily in addition to his Wellbutrin. Patient will also be seeing a psychologist for further treatment. Discussed with clinical  here in this office for further care. Review of Systems   Constitutional:  Negative for chills and fever. HENT:  Negative for congestion and sinus pain. Respiratory:  Negative for cough, shortness of breath and wheezing. Cardiovascular:  Negative for chest pain, palpitations and leg swelling. Gastrointestinal:  Negative for abdominal pain, constipation, diarrhea, nausea and vomiting. Genitourinary:  Negative for dysuria and frequency. Musculoskeletal:  Negative for myalgias. Skin:  Negative for rash. Allergic/Immunologic: Negative for environmental allergies. Neurological:  Negative for headaches. Hematological:  Does not bruise/bleed easily. Psychiatric/Behavioral:  Positive for decreased concentration and dysphoric mood. Negative for self-injury and suicidal ideas. The patient is nervous/anxious and is hyperactive. Current Outpatient Medications on File Prior to Visit   Medication Sig Dispense Refill    zolpidem (AMBIEN) 10 MG tablet Thru Dr in [facility]      buPROPion (WELLBUTRIN XL) 300 MG extended release tablet Ordered per Dr from [facility]      ciclopirox [facility]) 8 % solution Apply topically daily to affected nails. 6 mL 2    nystatin-triamcinolone (MYCOLOG II) 912600-1.1 UNIT/GM-% cream Apply topically 2 times daily. 60 g 3    bictegravir-emtricitab-tenofovir alafenamide (BIKTARVY) -25 MG TABS per tablet Take 1 tablet by mouth in the morning. testosterone cypionate (DEPOTESTOTERONE CYPIONATE) 200 MG/ML injection       acetaminophen (TYLENOL) 500 MG tablet Take 1 tablet by mouth 4 times daily as needed for Pain 120 tablet 0    fexofenadine (ALLEGRA) 180 MG tablet Take 1 tablet by mouth in the morning. (Patient not taking: Reported on 9/27/2022) 30 tablet 5    ALPRAZolam (XANAX) 1 MG tablet        No current facility-administered medications on file prior to visit. OBJECTIVE:    VS:   Vitals:    09/27/22 1317   BP: (!) 131/94   Site: Left Upper Arm   Position: Sitting   Cuff Size: Medium Adult   Pulse: 69   Resp: 16   Temp: 97.6 °F (36.4 °C)   TempSrc: Oral   SpO2: 98%   Weight: 202 lb 4.8 oz (91.8 kg)   Height: 6' (1.829 m)     Physical Exam  Vitals and nursing note reviewed. Constitutional:       Appearance: He is well-developed. HENT:      Head: Normocephalic and atraumatic. Eyes:      General: No scleral icterus. Conjunctiva/sclera: Conjunctivae normal.      Pupils: Pupils are equal, round, and reactive to light. Neck:      Vascular: No carotid bruit. Cardiovascular:      Rate and Rhythm: Normal rate and regular rhythm. Pulses:           Posterior tibial pulses are 2+ on the right side and 2+ on the left side. Heart sounds: Normal heart sounds, S1 normal and S2 normal. No murmur heard. Comments: No Edema in BL LE  Pulmonary:      Effort: Pulmonary effort is normal. No respiratory distress. Breath sounds: No decreased breath sounds, wheezing, rhonchi or rales. Abdominal:      General: Bowel sounds are normal.      Palpations: Abdomen is soft. There is no mass. Tenderness: There is no abdominal tenderness. There is no guarding. Neurological:      Mental Status: He is alert. Psychiatric:         Attention and Perception: Attention and perception normal.         Mood and Affect: Mood is anxious and elated. Affect is labile. Speech: Speech is rapid and pressured. Behavior: Behavior normal. Behavior is cooperative. Thought Content: Thought content normal. Thought content does not include homicidal or suicidal ideation. Thought content does not include homicidal or suicidal plan.          Cognition and Memory: Cognition and memory normal.         Judgment: Judgment normal.          ASSESSMENT/PLAN:    HIV  - Patient needs labs drawn for his chronic treatment of HIV for his [place] clinic to continue to prescribe medications  - Patient will be notifying this office of the labs which need to be drawn in order for them to continue to care, is for HIV clinic does not have ability to order labs in the state therefore they will be ordered by this office and faxed to his HIV clinic for further care, care coordination performed with the HIV clinic, records request sent  - Patient also tested positive for syphilis and therefore will need retesting for syphilis since he received penicillin in the emergency department    Depression  - Patient to continue Wellbutrin as well as starting on low-dose aripiprazole 2 mg daily  - Patient to see licensed clinical   -PHQ 9 score of 26 patient currently denies any suicidal homicidal ideation  - Discussed with patient that we do not prescribe Ambien or Xanax for treatment of anxiety or sleep in this office and if he would wish to continue those medications he will need to go to another psychiatrist for treatment  - Hydroxyzine ordered for patient    RTC:  Return in about 5 weeks (around 11/1/2022).       I have reviewed my findings and recommendations with Taras Allen DO   9/28/2022 3:02 PM

## 2022-09-27 NOTE — PROGRESS NOTES
Dr Tirso Richey notified of positive depression  screening. Discharge instructions reviewed with patient per Dr. Tirso Richey. Release of information signed by patient for [facility] and [address] [place] [address]. AVS given to patient.

## 2022-09-27 NOTE — PATIENT INSTRUCTIONS
Lab Tests to get prior to next Visit  1. None at this time     Changes in Medications  Start Abilify 2 mg daily   Hydroxzine for anxiety    Referrals   1. None at this time     Please follow up 5 weeks with our clinic to discuss your symptoms since starting the new medication. Please call if your symptoms worsen or fail to improve.

## 2022-09-28 ENCOUNTER — TELEPHONE (OUTPATIENT)
Dept: INTERNAL MEDICINE | Age: 52
End: 2022-09-28

## 2022-09-28 ENCOUNTER — PATIENT MESSAGE (OUTPATIENT)
Dept: INTERNAL MEDICINE | Age: 52
End: 2022-09-28

## 2022-09-28 PROBLEM — F32.A DEPRESSION: Status: ACTIVE | Noted: 2022-09-28

## 2022-09-28 PROBLEM — K62.9 RECTAL LESION: Status: RESOLVED | Noted: 2022-08-01 | Resolved: 2022-09-28

## 2022-09-28 RX ORDER — HYDROXYZINE HYDROCHLORIDE 25 MG/1
25 TABLET, FILM COATED ORAL NIGHTLY
Qty: 30 TABLET | Refills: 0 | Status: SHIPPED
Start: 2022-09-28 | End: 2022-10-25 | Stop reason: SDUPTHER

## 2022-09-28 RX ORDER — ARIPIPRAZOLE 2 MG/1
2 TABLET ORAL DAILY
Qty: 30 TABLET | Refills: 0 | Status: SHIPPED
Start: 2022-09-28 | End: 2022-10-25 | Stop reason: SDUPTHER

## 2022-09-28 ASSESSMENT — ENCOUNTER SYMPTOMS
WHEEZING: 0
SINUS PAIN: 0
NAUSEA: 0
DIARRHEA: 0
CONSTIPATION: 0
SHORTNESS OF BREATH: 0
ABDOMINAL PAIN: 0
VOMITING: 0
COUGH: 0

## 2022-09-28 NOTE — TELEPHONE ENCOUNTER
----- Message from [employer] sent at 9/28/2022 11:59 AM EDT -----  Subject: Appointment Request    Reason for Call: Established Patient Appointment needed: Routine Existing   Condition Follow Up    QUESTIONS    Reason for appointment request? No appointments available during search     Additional Information for Provider? Patient request call back to   reschedule his 10/11 appointment at 8 am. Also would like update on what   office needs for blood tests from his previous Dr. Ricky Gibson office be calling   his office in [facility] or does patient need to call. [facility] on [address] Nery Esteves [address]? [phone] email?  [email or web address]   ---------------------------------------------------------------------------  --------------  Tito Leon [facility]  [phone]; OK to leave message on voicemail  ---------------------------------------------------------------------------  --------------  SCRIPT ANSWERS  COVID Screen: Brandt Dow

## 2022-09-28 NOTE — TELEPHONE ENCOUNTER
----- Message from [employer] sent at 9/28/2022 12:02 PM EDT -----  Subject: Medication Problem    Medication: hydrOXYzine HCl (ATARAX) 25 MG tablet  Dosage: unknown  Ordering Provider: undefined    Question/Problem: Patient would like call back to let him know the   difference between this medication and the hydroxyzine he was previously   on.    Additional Information for Provider:     Pharmacy: [address], [address] [place] [address]   [phone] [facility] [phone]    ---------------------------------------------------------------------------  --------------  Shayy PISANO  [phone]; OK to leave message on voicemail  ---------------------------------------------------------------------------  --------------    SCRIPT ANSWERS  Relationship to Patient: Self

## 2022-09-28 NOTE — TELEPHONE ENCOUNTER
From: Blaire Font  To: Dr. Mendoza [place]: 9/28/2022 1:04 PM EDT  Subject: Pharmacy to use in [facility] you yesterday! And, Surjit Nazario for all you do!!    Due to the weather in [place], both my doctors office and pharmacy are closed until further notice. Please call in both of my prescriptions (ATARAX & Abilify) to:     [facility] in [place].   Address: [address] [place] [address], [address] [place] [address]  Phone: [phone]

## 2022-10-24 ENCOUNTER — OFFICE VISIT (OUTPATIENT)
Dept: PODIATRY | Age: 52
End: 2022-10-24
Payer: COMMERCIAL

## 2022-10-24 VITALS — HEIGHT: 72 IN | WEIGHT: 202 LBS | BODY MASS INDEX: 27.36 KG/M2

## 2022-10-24 DIAGNOSIS — B35.3 TINEA PEDIS OF BOTH FEET: Primary | ICD-10-CM

## 2022-10-24 DIAGNOSIS — B35.1 ONYCHOMYCOSIS: ICD-10-CM

## 2022-10-24 PROCEDURE — 99213 OFFICE O/P EST LOW 20 MIN: CPT | Performed by: PODIATRIST

## 2022-10-24 NOTE — PROGRESS NOTES
10/24/22     Iggy Serrano    : 1970   Sex: male    Age: 47 y.o. Patient's PCP/Provider is:  Elpidio Cowden, DO    Subjective:  Patient is seen today for follow-up regarding continued evaluation regarding onychomycotic nail issues and chronic tinea pedis issues to both lower extremities. Overall patient is doing better with both conditions at this time. No other additional abnormalities noted. Chief Complaint   Patient presents with    Nail Problem       ROS:  Const: Positives and pertinent negatives as per HPI. Musculo: Denies symptoms other than stated above. Neuro: Denies symptoms other than stated above. Skin: Denies symptoms other than stated above. Current Medications:    Current Outpatient Medications:     ARIPiprazole (ABILIFY) 2 MG tablet, Take 1 tablet by mouth daily, Disp: 30 tablet, Rfl: 0    hydrOXYzine HCl (ATARAX) 25 MG tablet, Take 1 tablet by mouth nightly, Disp: 30 tablet, Rfl: 0    zolpidem (AMBIEN) 10 MG tablet, Thru Dr in [facility], Disp: , Rfl:     buPROPion (WELLBUTRIN XL) 300 MG extended release tablet, Ordered per Dr from [facility], Disp: , Rfl:     ciclopirox (PENLAC) 8 % solution, Apply topically daily to affected nails. , Disp: 6 mL, Rfl: 2    nystatin-triamcinolone (MYCOLOG II) 727874-3.8 UNIT/GM-% cream, Apply topically 2 times daily. , Disp: 60 g, Rfl: 3    acetaminophen (TYLENOL) 500 MG tablet, Take 1 tablet by mouth 4 times daily as needed for Pain, Disp: 120 tablet, Rfl: 0    fexofenadine (ALLEGRA) 180 MG tablet, Take 1 tablet by mouth in the morning., Disp: 30 tablet, Rfl: 5    bictegravir-emtricitab-tenofovir alafenamide (BIKTARVY) -25 MG TABS per tablet, Take 1 tablet by mouth in the morning., Disp: , Rfl:     testosterone cypionate (DEPOTESTOTERONE CYPIONATE) 200 MG/ML injection, , Disp: , Rfl:     ALPRAZolam (XANAX) 1 MG tablet, , Disp: , Rfl:     Allergies:  No Known Allergies    Vitals:    10/24/22 1417   Weight: 202 lb (91.6 kg) Height: 6' (1.829 m)       Exam:  Neurovascular status unchanged. Tinea pedis issues improved to the arch and heel regions bilaterally. Mycotic nail issues improved to both lower extremities to the affected digital nails bilaterally. No maceration webspaces noted bilateral foot. Diagnostic Studies:     No results found. Procedures:    None    Plan Per Assessment  Afshin Jackman was seen today for nail problem. Diagnoses and all orders for this visit:    Tinea pedis of both feet  -     nystatin-triamcinolone (MYCOLOG II) 354426-2.1 UNIT/GM-% cream; Apply topically 2 times daily. Onychomycosis    Evaluation and management  We did discuss topical treatment options with patient in detail today regarding both nail dystrophy and skin issues bilaterally. Additional prescription for the topical Mycolog-II cream was given on today's visit as well. Will be followed up at a later date for continued evaluation and care. Seen By:    Edmund Bailey DPM    Electronically signed by Edmund Bailey DPM on 10/24/2022 at 3:04 PM    This note was created using voice recognition software. The note was reviewed however may contain grammatical errors.

## 2022-10-24 NOTE — PROGRESS NOTES
Patient is here for follow nail and foot fungus.  Trini Gordon DO  Electronically signed by Lexie Moore LPN on 15/65/0139 at 2:19 PM

## 2022-10-25 ENCOUNTER — TELEPHONE (OUTPATIENT)
Dept: INTERNAL MEDICINE | Age: 52
End: 2022-10-25

## 2022-10-25 RX ORDER — ARIPIPRAZOLE 2 MG/1
2 TABLET ORAL DAILY
Qty: 30 TABLET | Refills: 0 | Status: SHIPPED
Start: 2022-10-25 | End: 2022-11-23 | Stop reason: SDUPTHER

## 2022-10-25 RX ORDER — HYDROXYZINE HYDROCHLORIDE 25 MG/1
25 TABLET, FILM COATED ORAL NIGHTLY
Qty: 30 TABLET | Refills: 0 | Status: SHIPPED | OUTPATIENT
Start: 2022-10-25 | End: 2022-11-24

## 2022-10-25 NOTE — TELEPHONE ENCOUNTER
[facility] called states this pt requesting  Virtual Visit with Dr. Taras Dubois  phone call was referred to [facility]

## 2022-10-25 NOTE — TELEPHONE ENCOUNTER
Patient called requesting  medication refills. Patient also wanted to schedule a virtual visit.  Please advise

## 2022-10-26 RX ORDER — ARIPIPRAZOLE 2 MG/1
2 TABLET ORAL DAILY
Qty: 30 TABLET | Refills: 0 | Status: CANCELLED | OUTPATIENT
Start: 2022-10-26

## 2022-10-26 NOTE — TELEPHONE ENCOUNTER
----- Message from [facility] sent at 10/25/2022 10:55 AM EDT -----  Subject: Refill Request    QUESTIONS  Name of Medication? ARIPiprazole (ABILIFY) 2 MG tablet  Patient-reported dosage and instructions? 2 mg Take 1 tablet by mouth   daily  How many days do you have left? 0  Preferred Pharmacy? [facility] phone number (if available)? [phone]  Additional Information for Provider? PT needs refill on this medication   Please call PT back once refill is sent   ---------------------------------------------------------------------------  --------------  CALL BACK INFO  What is the best way for the office to contact you? OK to leave message on   voicemail  Preferred Call Back Phone Number? [phone]  ---------------------------------------------------------------------------  --------------  SCRIPT ANSWERS  Relationship to Patient?  Self

## 2022-10-28 ENCOUNTER — TELEPHONE (OUTPATIENT)
Dept: INTERNAL MEDICINE | Age: 52
End: 2022-10-28

## 2022-10-28 NOTE — TELEPHONE ENCOUNTER
Patient called stating that he got an appt with his psychiatrist in 2 weeks. Patient to cancel his virtual visit with Dr Cornelia Heller due to getting an appt. Dr Cornelia Heller notified. Canceling virtual visit.

## 2022-11-23 RX ORDER — ARIPIPRAZOLE 2 MG/1
2 TABLET ORAL DAILY
Qty: 30 TABLET | Refills: 2 | Status: SHIPPED | OUTPATIENT
Start: 2022-11-23

## 2022-11-23 RX ORDER — ARIPIPRAZOLE 2 MG/1
TABLET ORAL
Qty: 30 TABLET | Refills: 0 | OUTPATIENT
Start: 2022-11-23

## 2022-11-28 ENCOUNTER — OFFICE VISIT (OUTPATIENT)
Dept: PODIATRY | Age: 52
End: 2022-11-28
Payer: COMMERCIAL

## 2022-11-28 VITALS — HEIGHT: 72 IN | WEIGHT: 205 LBS | BODY MASS INDEX: 27.77 KG/M2

## 2022-11-28 DIAGNOSIS — B35.3 TINEA PEDIS OF BOTH FEET: ICD-10-CM

## 2022-11-28 DIAGNOSIS — B35.3 TINEA PEDIS OF BOTH FEET: Primary | ICD-10-CM

## 2022-11-28 DIAGNOSIS — B35.1 ONYCHOMYCOSIS: ICD-10-CM

## 2022-11-28 PROCEDURE — 99213 OFFICE O/P EST LOW 20 MIN: CPT | Performed by: PODIATRIST

## 2022-11-28 NOTE — PROGRESS NOTES
22     Sandhya Kras    : 1970   Sex: male    Age: 47 y.o. Patient's PCP/Provider is:  [facility] [address] Charisse Arana DO    Subjective:  Patient is seen today for follow-up regarding continued evaluation regarding mycotic nail issues and chronic tinea pedis issues to both feet. No other complicating features noted. Chief Complaint   Patient presents with    Nail Problem     Nail fungus        ROS:  Const: Positives and pertinent negatives as per HPI. Musculo: Denies symptoms other than stated above. Neuro: Denies symptoms other than stated above. Skin: Denies symptoms other than stated above. Current Medications:    Current Outpatient Medications:     ARIPiprazole (ABILIFY) 2 MG tablet, Take 1 tablet by mouth daily, Disp: 30 tablet, Rfl: 2    nystatin-triamcinolone (MYCOLOG II) 407872-9.1 UNIT/GM-% cream, Apply topically 2 times daily. , Disp: 60 g, Rfl: 3    zolpidem (AMBIEN) 10 MG tablet, Thru Dr in [facility], Disp: , Rfl:     buPROPion (WELLBUTRIN XL) 300 MG extended release tablet, Ordered per Dr from [facility], Disp: , Rfl:     ciclopirox (PENLAC) 8 % solution, Apply topically daily to affected nails. , Disp: 6 mL, Rfl: 2    acetaminophen (TYLENOL) 500 MG tablet, Take 1 tablet by mouth 4 times daily as needed for Pain, Disp: 120 tablet, Rfl: 0    fexofenadine (ALLEGRA) 180 MG tablet, Take 1 tablet by mouth in the morning., Disp: 30 tablet, Rfl: 5    bictegravir-emtricitab-tenofovir alafenamide (BIKTARVY) -25 MG TABS per tablet, Take 1 tablet by mouth in the morning., Disp: , Rfl:     testosterone cypionate (DEPOTESTOTERONE CYPIONATE) 200 MG/ML injection, , Disp: , Rfl:     ALPRAZolam (XANAX) 1 MG tablet, , Disp: , Rfl:     Allergies:  No Known Allergies    Vitals:    22 1313   Weight: 205 lb (93 kg)   Height: 6' (1.829 m)       Exam:  Neurovascular status unchanged. Pedis issue stable to both lower extremities. Mycotic nail issues improved digital regions right foot.   No maceration webspaces noted bilateral foot. Adequate range of motion digital regions bilateral foot. Diagnostic Studies:     No results found. Procedures:    None    Plan Per Assessment  Tad Maritza was seen today for nail problem. Diagnoses and all orders for this visit:    Tinea pedis of both feet    Onychomycosis      Evaluation and management  We did recommend continued use of both topical nail lacquer and topical creams to both regions to allow for continued improvement in symptoms. Patient was advised appropriate shoe gear to utilize daily as well. Patient will be followed up at a later date for continued evaluation and management. Seen By:    Reji Hale DPM    Electronically signed by Reji Hale DPM on 11/28/2022 at 1:38 PM    This note was created using voice recognition software. The note was reviewed however may contain grammatical errors.

## 2022-11-29 ENCOUNTER — HOSPITAL ENCOUNTER (OUTPATIENT)
Dept: NON INVASIVE DIAGNOSTICS | Age: 52
Discharge: HOME | End: 2022-11-29
Payer: COMMERCIAL

## 2022-11-29 LAB
EKG ATRIAL RATE: 66 BPM
EKG P AXIS: 28 DEGREES
EKG P-R INTERVAL: 192 MS
EKG Q-T INTERVAL: 354 MS
EKG QRS DURATION: 82 MS
EKG QTC CALCULATION (BAZETT): 371 MS
EKG R AXIS: 28 DEGREES
EKG T AXIS: 47 DEGREES
EKG VENTRICULAR RATE: 66 BPM

## 2022-11-29 PROCEDURE — 93010 ELECTROCARDIOGRAM REPORT: CPT | Performed by: INTERNAL MEDICINE

## 2022-11-29 PROCEDURE — 93005 ELECTROCARDIOGRAM TRACING: CPT | Performed by: NURSE PRACTITIONER

## 2022-12-22 ENCOUNTER — OFFICE VISIT (OUTPATIENT)
Dept: INTERNAL MEDICINE | Age: 52
End: 2022-12-22
Payer: COMMERCIAL

## 2022-12-22 VITALS
RESPIRATION RATE: 18 BRPM | HEART RATE: 93 BPM | BODY MASS INDEX: 27.77 KG/M2 | SYSTOLIC BLOOD PRESSURE: 113 MMHG | DIASTOLIC BLOOD PRESSURE: 78 MMHG | OXYGEN SATURATION: 95 % | HEIGHT: 72 IN | WEIGHT: 205 LBS | TEMPERATURE: 97.2 F

## 2022-12-22 DIAGNOSIS — J21.8 ACUTE BRONCHIOLITIS DUE TO OTHER SPECIFIED ORGANISMS: Primary | ICD-10-CM

## 2022-12-22 DIAGNOSIS — F32.A DEPRESSION, UNSPECIFIED DEPRESSION TYPE: ICD-10-CM

## 2022-12-22 DIAGNOSIS — B20 HIV INFECTION, UNSPECIFIED SYMPTOM STATUS (HCC): ICD-10-CM

## 2022-12-22 DIAGNOSIS — R05.1 ACUTE COUGH: ICD-10-CM

## 2022-12-22 DIAGNOSIS — R52 GENERALIZED BODY ACHES: ICD-10-CM

## 2022-12-22 DIAGNOSIS — B04 MONKEYPOX: ICD-10-CM

## 2022-12-22 LAB
INFLUENZA A ANTIGEN, POC: POSITIVE
INFLUENZA B ANTIGEN, POC: NEGATIVE
Lab: NORMAL
PERFORMING INSTRUMENT: NORMAL
QC PASS/FAIL: NORMAL
SARS-COV-2, POC: NORMAL

## 2022-12-22 PROCEDURE — 87426 SARSCOV CORONAVIRUS AG IA: CPT | Performed by: INTERNAL MEDICINE

## 2022-12-22 PROCEDURE — 87804 INFLUENZA ASSAY W/OPTIC: CPT | Performed by: INTERNAL MEDICINE

## 2022-12-22 PROCEDURE — 99212 OFFICE O/P EST SF 10 MIN: CPT | Performed by: INTERNAL MEDICINE

## 2022-12-22 RX ORDER — BROMPHENIRAMINE MALEATE, PSEUDOEPHEDRINE HYDROCHLORIDE, AND DEXTROMETHORPHAN HYDROBROMIDE 2; 30; 10 MG/5ML; MG/5ML; MG/5ML
5 SYRUP ORAL 4 TIMES DAILY PRN
Qty: 120 ML | Refills: 0 | Status: SHIPPED | OUTPATIENT
Start: 2022-12-22

## 2022-12-22 RX ORDER — TADALAFIL 5 MG/1
TABLET ORAL
COMMUNITY
Start: 2022-12-14

## 2022-12-22 RX ORDER — DOXYCYCLINE HYCLATE 100 MG
100 TABLET ORAL 2 TIMES DAILY
Qty: 14 TABLET | Refills: 0 | Status: SHIPPED | OUTPATIENT
Start: 2022-12-22 | End: 2022-12-29

## 2022-12-22 RX ORDER — PENTOXIFYLLINE 400 MG/1
TABLET, EXTENDED RELEASE ORAL
COMMUNITY
Start: 2022-12-14

## 2022-12-22 RX ORDER — IBUPROFEN 800 MG/1
800 TABLET ORAL EVERY 8 HOURS PRN
Qty: 14 TABLET | Refills: 0 | Status: SHIPPED | OUTPATIENT
Start: 2022-12-22

## 2022-12-22 RX ORDER — AZELASTINE 1 MG/ML
SPRAY, METERED NASAL
COMMUNITY
Start: 2022-10-26

## 2022-12-22 ASSESSMENT — ENCOUNTER SYMPTOMS
COUGH: 1
ALLERGIC/IMMUNOLOGIC NEGATIVE: 1
ABDOMINAL PAIN: 0
DIARRHEA: 0
BACK PAIN: 0
VOMITING: 0

## 2022-12-22 NOTE — PROGRESS NOTES
[facility]    Attending Physician Statement  I have discussed the case, including pertinent history and exam findings with the resident physician. I agree with the assessment, plan and orders as documented by the resident. I have reviewed all pertinent PMHx, PSHx, FamHx, SocialHx, medications, and allergies and updated history as appropriate. Here for sick visit with URI symptoms of 5 days of fatigue, night sweats, dry cough which he feels is worsening over past 2 days. Influenza A positive. Recommend start doxycycline to cover possible superimposed bacterial infection and continue conservative management with OTC meds, add Bromfed. Remainder of medical problems as per resident note.     Scar Atkins,   12/22/2022 1:53 PM

## 2022-12-22 NOTE — PATIENT INSTRUCTIONS
Dear Jay Mercedes,    Thank you for coming to your appointment today. I hope we have addressed all of your needs. Please make sure to do the following:  - Continue your medications as listed. - Eat healthy food and do physical workout as tolerated   - We will see each other again in 1 month     Call for a sooner appointment if needed     Have a great day!     Sincerely,  Isac Pal M.D  12/22/2022  2:08 PM

## 2022-12-22 NOTE — PROGRESS NOTES
[facility] Internal Medicine      SUBJECTIVE:  Denis Henriquez (:  1970) is a 47 y.o. male here for evaluation of the following chief complaint(s): Other (chills, night sweats, stuffy head, deep cough, body aches x4-5 days worse since yesterday)    47year old man with medical history of HIV, depression seen in the clinic for upper respiratory symptoms (urgent care)      Last seen no 2022  Monkeypox dectected on 2022  RPR positive  Syphilis reactive    Sees Podiatry for onychomycosis on nystatin-triacninolone    The patient complains of chills, myalgias, and nonproductive cough. Onset of symptoms was abrupt 5 days ago. Symptom course has been worsening. Symptoms tend to occur with cough. Symptoms are aggravated by nothing, alleviated by rest and OTC meds. Patient denies chest pain, fever, running nose . Patient denies occupational or hobby exposure to irritants. Review of Systems   Constitutional:  Positive for chills, diaphoresis and fatigue. HENT: Negative. Eyes:  Negative for visual disturbance. Respiratory:  Positive for cough. Cardiovascular: Negative. Gastrointestinal:  Negative for abdominal pain, diarrhea and vomiting. Genitourinary:  Negative for difficulty urinating and dysuria. Musculoskeletal:  Negative for arthralgias, back pain, joint swelling and neck stiffness. Skin: Negative. Allergic/Immunologic: Negative. Neurological:  Negative for dizziness, seizures, weakness and headaches. Hematological:  Negative for adenopathy. Does not bruise/bleed easily. All other systems reviewed and are negative.     Current Outpatient Medications on File Prior to Visit   Medication Sig Dispense Refill    pentoxifylline (TRENTAL) 400 MG extended release tablet       tadalafil (CIALIS) 5 MG tablet       azelastine (ASTELIN) 0.1 % nasal spray       nystatin-triamcinolone (MYCOLOG II) 722344-5.1 UNIT/GM-% cream Apply topically 2 Palpations: Abdomen is soft. Musculoskeletal:         General: No swelling. Normal range of motion. Cervical back: Normal range of motion and neck supple. Skin:     General: Skin is warm. Neurological:      General: No focal deficit present. Mental Status: He is alert and oriented to person, place, and time. Cranial Nerves: No cranial nerve deficit. Gait: Gait normal.   Psychiatric:         Mood and Affect: Mood normal.        ASSESSMENT/PLAN:    Upper resp viral infections  Influenza A +  COVID19 -  Coup syrup brompheniramine-pseudoephedrine-DM (BROMFED DM)   For suspected bacterial PNA Doxycycline 7 days course       NSAID for pain  Instructed to go to ED if symptom worsening   Vitals are stable, sating 95% on AA    HIV infection  On therapy form  in [place]    He is in the process of moving back to [place]   Not interested in ID referral here    Depression/anxiety   Getting meds form Psych[facility] and physician in [place]      RTC:  Return in about 1 month (around 1/22/2023) for PCP follow up. I have reviewed my findings and recommendations with Riri Casarez and Dr. Sal Briones.     Christiane Barragan MD   12/22/2022 2:30 PM

## 2023-01-08 LAB
RPR TITER: NORMAL
RPR: NORMAL

## 2023-01-16 ENCOUNTER — TELEPHONE (OUTPATIENT)
Dept: INTERNAL MEDICINE | Age: 53
End: 2023-01-16

## 2023-01-16 NOTE — TELEPHONE ENCOUNTER
Patient states he was seen in December for Flu and was prescribed an antibiotic. Patient states he does not feel the antibiotic was strong enough because he is still having symptoms. Patient states he is still having a dry cough, coughs all night long, energy is low, nasal congestion, feels rundown. Patient denies fever, nausea, chills and vomiting.

## 2023-01-17 ENCOUNTER — OFFICE VISIT (OUTPATIENT)
Dept: INTERNAL MEDICINE | Age: 53
End: 2023-01-17
Payer: COMMERCIAL

## 2023-01-17 ENCOUNTER — HOSPITAL ENCOUNTER (OUTPATIENT)
Dept: GENERAL RADIOLOGY | Age: 53
Discharge: HOME | End: 2023-01-19

## 2023-01-17 ENCOUNTER — HOSPITAL ENCOUNTER (OUTPATIENT)
Age: 53
End: 2023-01-17

## 2023-01-17 VITALS
TEMPERATURE: 98.1 F | RESPIRATION RATE: 20 BRPM | SYSTOLIC BLOOD PRESSURE: 121 MMHG | HEART RATE: 85 BPM | OXYGEN SATURATION: 95 % | DIASTOLIC BLOOD PRESSURE: 93 MMHG

## 2023-01-17 DIAGNOSIS — R05.1 ACUTE COUGH: Primary | ICD-10-CM

## 2023-01-17 DIAGNOSIS — J20.9 SUBACUTE BRONCHITIS: ICD-10-CM

## 2023-01-17 DIAGNOSIS — R05.1 ACUTE COUGH: ICD-10-CM

## 2023-01-17 PROBLEM — B04 MONKEYPOX: Status: RESOLVED | Noted: 2022-08-12 | Resolved: 2023-01-17

## 2023-01-17 LAB
Lab: NORMAL
PERFORMING INSTRUMENT: NORMAL
QC PASS/FAIL: NORMAL
SARS-COV-2, POC: NORMAL

## 2023-01-17 PROCEDURE — 99212 OFFICE O/P EST SF 10 MIN: CPT | Performed by: INTERNAL MEDICINE

## 2023-01-17 PROCEDURE — 87426 SARSCOV CORONAVIRUS AG IA: CPT | Performed by: INTERNAL MEDICINE

## 2023-01-17 PROCEDURE — 99213 OFFICE O/P EST LOW 20 MIN: CPT | Performed by: INTERNAL MEDICINE

## 2023-01-17 PROCEDURE — 71046 X-RAY EXAM CHEST 2 VIEWS: CPT

## 2023-01-17 RX ORDER — CEFDINIR 300 MG/1
300 CAPSULE ORAL 2 TIMES DAILY
Qty: 10 CAPSULE | Refills: 0 | Status: SHIPPED | OUTPATIENT
Start: 2023-01-17 | End: 2023-01-22

## 2023-01-17 RX ORDER — AZITHROMYCIN 250 MG/1
250 TABLET, FILM COATED ORAL SEE ADMIN INSTRUCTIONS
Qty: 6 TABLET | Refills: 0 | Status: SHIPPED | OUTPATIENT
Start: 2023-01-17 | End: 2023-01-22

## 2023-01-17 RX ORDER — GUAIFENESIN 100 MG/5ML
200 SYRUP ORAL 3 TIMES DAILY PRN
Qty: 355 ML | Refills: 1 | Status: SHIPPED | OUTPATIENT
Start: 2023-01-17

## 2023-01-17 RX ORDER — ALBUTEROL SULFATE 90 UG/1
2 AEROSOL, METERED RESPIRATORY (INHALATION) 4 TIMES DAILY PRN
Qty: 54 G | Refills: 1 | Status: SHIPPED | OUTPATIENT
Start: 2023-01-17

## 2023-01-17 RX ORDER — BENZONATATE 100 MG/1
100 CAPSULE ORAL 2 TIMES DAILY PRN
Qty: 20 CAPSULE | Refills: 0 | Status: SHIPPED | OUTPATIENT
Start: 2023-01-17 | End: 2023-01-24

## 2023-01-17 ASSESSMENT — ENCOUNTER SYMPTOMS
DIARRHEA: 0
COUGH: 1
WHEEZING: 1
SINUS PAIN: 0
ABDOMINAL PAIN: 0
CONSTIPATION: 0
SHORTNESS OF BREATH: 1
VOMITING: 0
RHINORRHEA: 1
NAUSEA: 0

## 2023-01-17 NOTE — PATIENT INSTRUCTIONS
Lab Tests to get prior to next Visit  1. CXR     Changes in Medications  Start taking both antibiotics for 5 days;  Use the altarussin and tessalon pearls for cough and congestion  Use the albuterol inhaler every 6-8 hours as needed     Referrals   1. None at this time     Please follow up 10 weeks with our clinic. Please call if your symptoms worsen or fail to improve.

## 2023-01-17 NOTE — PROGRESS NOTES
[facility] Internal Medicine      SUBJECTIVE:  Mary Ann Hope (:  1970) is a 47 y.o. male here for evaluation of the following chief complaint(s):  Cough (Patient has a dry cough), Fatigue, and Other (Patient states that there is an area in his left nares collects \" something in it\" )    Patient is here for evaluation of his cough and congestion. Patient was diagnosed with flu in December. He had improvement in his symptoms after being given cough suppressant and doxycycline but symptoms worsened after completing medications. He went to [facility] last week and upon returning had worsening symptoms of cough congestion fatigue and SOB. Patient denies any recent exposure to animals, pets, hiking, caves, or sick individuals. Patient states that he has been compliant with his medications and his most recent CD4 and HIV viral loads have been normal.  He states that he has not been coughing up any sputum. Review of Systems   Constitutional:  Negative for chills and fever. HENT:  Positive for congestion, postnasal drip and rhinorrhea. Negative for sinus pain. Respiratory:  Positive for cough, shortness of breath and wheezing. Cardiovascular:  Negative for chest pain, palpitations and leg swelling. Gastrointestinal:  Negative for abdominal pain, constipation, diarrhea, nausea and vomiting. Genitourinary:  Negative for dysuria and frequency. Musculoskeletal:  Negative for myalgias. Skin:  Negative for rash. Allergic/Immunologic: Negative for environmental allergies. Neurological:  Negative for headaches. Hematological:  Does not bruise/bleed easily. Psychiatric/Behavioral:  Negative for suicidal ideas.       Current Outpatient Medications on File Prior to Visit   Medication Sig Dispense Refill    tadalafil (CIALIS) 5 MG tablet       azelastine (ASTELIN) 0.1 % nasal spray       zolpidem (AMBIEN) 10 MG tablet Thru Dr in [facility]      buPROPion (WELLBUTRIN XL) 300 MG extended release tablet Ordered per  from [facility]      bictegravir-emtricitab-tenofovir alafenamide (BIKTARVY) -25 MG TABS per tablet Take 1 tablet by mouth in the morning. testosterone cypionate (DEPOTESTOTERONE CYPIONATE) 200 MG/ML injection       ALPRAZolam (XANAX) 1 MG tablet       ibuprofen (IBU) 800 MG tablet Take 1 tablet by mouth every 8 hours as needed for Pain Take with food. 14 tablet 0    nystatin-triamcinolone (MYCOLOG II) 758835-5.1 UNIT/GM-% cream Apply topically 2 times daily. 60 g 3    ciclopirox (PENLAC) 8 % solution Apply topically daily to affected nails. 6 mL 2    acetaminophen (TYLENOL) 500 MG tablet Take 1 tablet by mouth 4 times daily as needed for Pain 120 tablet 0     No current facility-administered medications on file prior to visit. OBJECTIVE:    VS:   Vitals:    01/17/23 1457   BP: (!) 121/93   Site: Left Upper Arm   Position: Sitting   Cuff Size: Medium Adult   Pulse: 85   Resp: 20   Temp: 98.1 °F (36.7 °C)   TempSrc: Temporal   SpO2: 95%     Physical Exam  Vitals and nursing note reviewed. Constitutional:       Appearance: He is well-developed. HENT:      Head: Normocephalic and atraumatic. Eyes:      General: No scleral icterus. Conjunctiva/sclera: Conjunctivae normal.      Pupils: Pupils are equal, round, and reactive to light. Neck:      Vascular: No carotid bruit. Cardiovascular:      Rate and Rhythm: Normal rate and regular rhythm. Pulses:           Posterior tibial pulses are 2+ on the right side and 2+ on the left side. Heart sounds: Normal heart sounds, S1 normal and S2 normal. No murmur heard. Comments: No Edema in BL LE  Pulmonary:      Effort: Pulmonary effort is normal. No tachypnea, bradypnea, accessory muscle usage, respiratory distress or retractions. Breath sounds: Transmitted upper airway sounds present. No stridor. Examination of the right-upper field reveals wheezing.  Examination of the left-upper field reveals wheezing. Examination of the right-middle field reveals wheezing. Examination of the left-middle field reveals wheezing. Examination of the left-lower field reveals wheezing. Wheezing present. No decreased breath sounds, rhonchi or rales. Abdominal:      General: Bowel sounds are normal.      Palpations: Abdomen is soft. There is no mass. Tenderness: There is no abdominal tenderness. There is no guarding. Neurological:      Mental Status: He is alert. ASSESSMENT/PLAN:    Subacute Bronchitis   -patient having cough and wheezing in all lung fields; post nasal drip present, upper airway sounds transmitted to lung fields but no stridor   -ENT examination did not show any polyps or other growths; no enlarged tonsil   -COVID negative with recent travel  -CXR ordered for evaluation of lungs   -treating with CAP antibiotics with immunosuppression and symptoms   -cough suppressants and albuterol ordered;   -further imaging, Pft, and treatment based on response to above treatment. RTC:  Return in about 10 weeks (around 3/28/2023).       I have reviewed my findings and recommendations with Kyree Coleman DO   1/17/2023 4:03 PM

## 2023-01-18 ENCOUNTER — TELEPHONE (OUTPATIENT)
Dept: INTERNAL MEDICINE | Age: 53
End: 2023-01-18

## 2023-01-18 DIAGNOSIS — R05.1 ACUTE COUGH: Primary | ICD-10-CM

## 2023-01-18 DIAGNOSIS — J20.9 SUBACUTE BRONCHITIS: ICD-10-CM

## 2023-01-18 NOTE — TELEPHONE ENCOUNTER
Called and spoke with patient, patient has a second chart where his CXR from 01/17/2023 is located. Did not show any signs of pneumonia but did show narrowing of the lateral sides of the trachea subglottically. With patient having transmission of Upper Airway sounds on examination yesterday during examination, plan to refer to ENT for laryngoscopy and further evaluation. Patient states that he has been having difficulty clearing his throat for the last few months and a chronic cough upon further questioning and dsicussion. CT scan of cervical spine evaluated and not evidence of tracheal stenosis present from May 2022.       Electronically signed by Marck Hilario DO on 1/18/2023 at 12:56 PM

## 2023-03-02 ENCOUNTER — TELEPHONE (OUTPATIENT)
Dept: INTERNAL MEDICINE | Age: 53
End: 2023-03-02

## 2023-03-02 NOTE — TELEPHONE ENCOUNTER
Pt called asking if we had access or could get his old EKG. States he just recently had EKG done and was told that he had a previous heart attack. States he was not aware of this and unsure when he had it. Reviewed care everywhere as pt states he thought he had one done at [facility] in \[facility]". EKG report noted from 8/26/20. Pt asking if Dr can review. Please advise.

## 2023-03-03 NOTE — TELEPHONE ENCOUNTER
Called and left message for patient. Author evaluated EKG and did not see signs of previous infarct or ischemia. Left message for patinet to call office back for further information. Author will discuss further treatment and evaluation when patient returns call.       Electronically signed by Arthur Levine DO on 3/3/2023 at 2:55 PM

## 2023-03-09 NOTE — TELEPHONE ENCOUNTER
Pt states he was returning Dr. Miroslava Bradley phone call. Also states he has a new insurance card and would like return call to update his records. Sending to front office and Dr. Shiar Leon.

## 2023-03-09 NOTE — TELEPHONE ENCOUNTER
Spoke with patient and informed of previous message with Dr. Nichole Barber. I also informed patient to bring insurance card to his appointment on 3/15/2023 and we can update his chart. Patient verbalized understanding and stated there was no need for Dr. Nichole Barber to call him back unless he needed to speak with him.

## 2023-03-15 ENCOUNTER — OFFICE VISIT (OUTPATIENT)
Dept: INTERNAL MEDICINE | Age: 53
End: 2023-03-15
Payer: COMMERCIAL

## 2023-03-15 VITALS
WEIGHT: 216.1 LBS | OXYGEN SATURATION: 96 % | BODY MASS INDEX: 29.27 KG/M2 | RESPIRATION RATE: 20 BRPM | TEMPERATURE: 97.9 F | SYSTOLIC BLOOD PRESSURE: 144 MMHG | HEART RATE: 63 BPM | HEIGHT: 72 IN | DIASTOLIC BLOOD PRESSURE: 103 MMHG

## 2023-03-15 DIAGNOSIS — F32.A DEPRESSION, UNSPECIFIED DEPRESSION TYPE: Primary | ICD-10-CM

## 2023-03-15 DIAGNOSIS — K21.00 GASTROESOPHAGEAL REFLUX DISEASE WITH ESOPHAGITIS WITHOUT HEMORRHAGE: ICD-10-CM

## 2023-03-15 DIAGNOSIS — Z21 ASYMPTOMATIC HIV INFECTION, WITH NO HISTORY OF HIV-RELATED ILLNESS (HCC): ICD-10-CM

## 2023-03-15 PROCEDURE — 99212 OFFICE O/P EST SF 10 MIN: CPT | Performed by: INTERNAL MEDICINE

## 2023-03-15 PROCEDURE — 99213 OFFICE O/P EST LOW 20 MIN: CPT | Performed by: INTERNAL MEDICINE

## 2023-03-15 RX ORDER — OMEPRAZOLE 20 MG/1
20 CAPSULE, DELAYED RELEASE ORAL DAILY
COMMUNITY
End: 2023-03-17 | Stop reason: ALTCHOICE

## 2023-03-15 NOTE — PROGRESS NOTES
[facility] Internal Medicine      SUBJECTIVE:  Sandhya Evans (:  1970) is a 47 y.o. male here for evaluation of the following chief complaint(s):  6 Month Follow-Up    No Acute complains today; here for 6 month followup; all questions answered     Review of Systems   Constitutional:  Negative for chills and fever. HENT:  Negative for congestion and sinus pain. Respiratory:  Negative for cough, shortness of breath and wheezing. Cardiovascular:  Negative for chest pain, palpitations and leg swelling. Gastrointestinal:  Negative for abdominal pain, constipation, diarrhea, nausea and vomiting. Genitourinary:  Negative for dysuria and frequency. Musculoskeletal:  Negative for myalgias. Skin:  Negative for rash. Allergic/Immunologic: Negative for environmental allergies. Neurological:  Negative for headaches. Hematological:  Does not bruise/bleed easily. Psychiatric/Behavioral:  Negative for suicidal ideas. Current Outpatient Medications on File Prior to Visit   Medication Sig Dispense Refill    tadalafil (CIALIS) 5 MG tablet       azelastine (ASTELIN) 0.1 % nasal spray       ibuprofen (IBU) 800 MG tablet Take 1 tablet by mouth every 8 hours as needed for Pain Take with food. 14 tablet 0    nystatin-triamcinolone (MYCOLOG II) 737682-7.1 UNIT/GM-% cream Apply topically 2 times daily. 60 g 3    ciclopirox (PENLAC) 8 % solution Apply topically daily to affected nails. 6 mL 2    zolpidem (AMBIEN) 10 MG tablet Thru Dr in [facility]      buPROPion (WELLBUTRIN XL) 300 MG extended release tablet Ordered per Dr from [facility]      acetaminophen (TYLENOL) 500 MG tablet Take 1 tablet by mouth 4 times daily as needed for Pain 120 tablet 0    bictegravir-emtricitab-tenofovir alafenamide (BIKTARVY) -25 MG TABS per tablet Take 1 tablet by mouth in the morning.       testosterone cypionate (DEPOTESTOTERONE CYPIONATE) 200 MG/ML injection ALPRAZolam (XANAX) 1 MG tablet       omeprazole (PRILOSEC) 40 MG delayed release capsule TAKE 1 CAPSULE BY MOUTH 30 MINUTES BEFORE A MEAL DAILY FOR 6 WEEKS      pentoxifylline (TRENTAL) 400 MG extended release tablet TAKE ONE TABLET BY MOUTH TWO TIMES DAILY      albuterol sulfate HFA (VENTOLIN HFA) 108 (90 Base) MCG/ACT inhaler Inhale 2 puffs into the lungs 4 times daily as needed for Wheezing (Patient not taking: Reported on 3/15/2023) 54 g 1     No current facility-administered medications on file prior to visit. OBJECTIVE:    VS:   Vitals:    03/15/23 1526   BP: (!) 144/103   Site: Left Upper Arm   Position: Sitting   Cuff Size: Medium Adult   Pulse: 63   Resp: 20   Temp: 97.9 °F (36.6 °C)   TempSrc: Temporal   SpO2: 96%   Weight: 216 lb 1.6 oz (98 kg)   Height: 6' (1.829 m)     Physical Exam  Vitals and nursing note reviewed. Constitutional:       Appearance: He is well-developed. HENT:      Head: Normocephalic and atraumatic. Eyes:      General: No scleral icterus. Conjunctiva/sclera: Conjunctivae normal.      Pupils: Pupils are equal, round, and reactive to light. Neck:      Vascular: No carotid bruit. Cardiovascular:      Rate and Rhythm: Normal rate and regular rhythm. Pulses:           Posterior tibial pulses are 2+ on the right side and 2+ on the left side. Heart sounds: Normal heart sounds, S1 normal and S2 normal. No murmur heard. Comments: No Edema in BL LE  Pulmonary:      Effort: Pulmonary effort is normal. No respiratory distress. Breath sounds: No decreased breath sounds, wheezing, rhonchi or rales. Abdominal:      General: Bowel sounds are normal.      Palpations: Abdomen is soft. There is no mass. Tenderness: There is no abdominal tenderness. There is no guarding. Neurological:      Mental Status: He is alert.           ASSESSMENT/PLAN:    Depression  -controlled; continue medications as prescribed by psychiatrist; patient not requiring mood stabilizers at thsi time     HIV  -follows with outpatient ID physician; controlled     Peyronies Disease   -following with urology     RTC:  Return in about 4 months (around 7/15/2023).       I have reviewed my findings and recommendations with Bear Marinelli DO   3/17/2023 10:55 AM

## 2023-03-16 PROBLEM — K21.9 GERD (GASTROESOPHAGEAL REFLUX DISEASE): Status: ACTIVE | Noted: 2023-03-16

## 2023-03-16 NOTE — PATIENT INSTRUCTIONS
Lab Tests to get prior to next Visit  1. None at this time     Changes in Medications  None at this time; but if your blood pressure is elevated at your next visit then we will start a new medication     Referrals   1. None     Please follow up 4 months with our clinic. Please call if your symptoms worsen or fail to improve.

## 2023-03-17 RX ORDER — PENTOXIFYLLINE 400 MG/1
TABLET, EXTENDED RELEASE ORAL
COMMUNITY
Start: 2023-02-23

## 2023-03-17 RX ORDER — OMEPRAZOLE 40 MG/1
CAPSULE, DELAYED RELEASE ORAL
COMMUNITY
Start: 2023-02-17

## 2023-03-17 ASSESSMENT — ENCOUNTER SYMPTOMS
COUGH: 0
DIARRHEA: 0
NAUSEA: 0
ABDOMINAL PAIN: 0
SHORTNESS OF BREATH: 0
WHEEZING: 0
SINUS PAIN: 0
CONSTIPATION: 0
VOMITING: 0

## 2023-03-20 ENCOUNTER — TELEPHONE (OUTPATIENT)
Dept: INTERNAL MEDICINE | Age: 53
End: 2023-03-20

## 2023-03-20 DIAGNOSIS — A53.9 ACQUIRED SYPHILIS: Primary | ICD-10-CM

## 2023-03-20 NOTE — TELEPHONE ENCOUNTER
Patient has hx of treated syphillis; ordering confirmatory testing for patient for eradication. Called and left message for patient to call clinic back. Mailing script to patient.      Electronically signed by Waylon Gupta DO on 3/20/2023 at 1:41 PM

## 2023-03-21 DIAGNOSIS — A53.9 ACQUIRED SYPHILIS: ICD-10-CM

## 2023-03-24 ENCOUNTER — PROCEDURE VISIT (OUTPATIENT)
Dept: PODIATRY | Age: 53
End: 2023-03-24
Payer: MEDICAID

## 2023-03-24 VITALS — WEIGHT: 216 LBS | HEIGHT: 72 IN | BODY MASS INDEX: 29.26 KG/M2

## 2023-03-24 DIAGNOSIS — B35.3 TINEA PEDIS OF BOTH FEET: Primary | ICD-10-CM

## 2023-03-24 DIAGNOSIS — B35.1 ONYCHOMYCOSIS: ICD-10-CM

## 2023-03-24 PROCEDURE — 99213 OFFICE O/P EST LOW 20 MIN: CPT | Performed by: PODIATRIST

## 2023-03-24 NOTE — PROGRESS NOTES
Patient is in today for routine nail care and fungus on both feet.  Pcp is Vaibhav Oliveira DO  Last ov 3/15/23

## 2023-03-25 NOTE — PROGRESS NOTES
3/24/23     Susannah Judy    : 1970   Sex: male    Age: 47 y.o. Patient's PCP/Provider is:  Sarkis Odell DO    Subjective:  Patient is seen today for follow-up regarding continued care nail dystrophy and tinea issues to both feet. Patient has been using medications as instructed. No other additional issues noted. Chief Complaint   Patient presents with    Nail Problem     Nail care       ROS:  Const: Positives and pertinent negatives as per HPI. Musculo: Denies symptoms other than stated above. Neuro: Denies symptoms other than stated above. Skin: Denies symptoms other than stated above. Current Medications:    Current Outpatient Medications:     omeprazole (PRILOSEC) 40 MG delayed release capsule, TAKE 1 CAPSULE BY MOUTH 30 MINUTES BEFORE A MEAL DAILY FOR 6 WEEKS, Disp: , Rfl:     pentoxifylline (TRENTAL) 400 MG extended release tablet, TAKE ONE TABLET BY MOUTH TWO TIMES DAILY, Disp: , Rfl:     tadalafil (CIALIS) 5 MG tablet, , Disp: , Rfl:     azelastine (ASTELIN) 0.1 % nasal spray, , Disp: , Rfl:     ibuprofen (IBU) 800 MG tablet, Take 1 tablet by mouth every 8 hours as needed for Pain Take with food. , Disp: 14 tablet, Rfl: 0    nystatin-triamcinolone (MYCOLOG II) 532138-1.9 UNIT/GM-% cream, Apply topically 2 times daily. , Disp: 60 g, Rfl: 3    ciclopirox (PENLAC) 8 % solution, Apply topically daily to affected nails. , Disp: 6 mL, Rfl: 2    zolpidem (AMBIEN) 10 MG tablet, Thru Dr in [facility], Disp: , Rfl:     buPROPion (WELLBUTRIN XL) 300 MG extended release tablet, Ordered per Dr from [facility], Disp: , Rfl:     acetaminophen (TYLENOL) 500 MG tablet, Take 1 tablet by mouth 4 times daily as needed for Pain, Disp: 120 tablet, Rfl: 0    bictegravir-emtricitab-tenofovir alafenamide (BIKTARVY) -25 MG TABS per tablet, Take 1 tablet by mouth in the morning., Disp: , Rfl:     testosterone cypionate (DEPOTESTOTERONE CYPIONATE) 200 MG/ML injection, , Disp: , Rfl:     ALPRAZolam

## 2023-04-04 ENCOUNTER — TELEPHONE (OUTPATIENT)
Dept: INTERNAL MEDICINE | Age: 53
End: 2023-04-04

## 2023-04-04 ENCOUNTER — OFFICE VISIT (OUTPATIENT)
Dept: ENT CLINIC | Age: 53
End: 2023-04-04
Payer: MEDICAID

## 2023-04-04 ENCOUNTER — TELEPHONE (OUTPATIENT)
Dept: ENT CLINIC | Age: 53
End: 2023-04-04

## 2023-04-04 VITALS
HEART RATE: 70 BPM | HEIGHT: 72 IN | WEIGHT: 212 LBS | DIASTOLIC BLOOD PRESSURE: 108 MMHG | TEMPERATURE: 98.4 F | OXYGEN SATURATION: 97 % | SYSTOLIC BLOOD PRESSURE: 164 MMHG | BODY MASS INDEX: 28.71 KG/M2

## 2023-04-04 DIAGNOSIS — Z87.09 HISTORY OF BRONCHITIS: Primary | ICD-10-CM

## 2023-04-04 PROCEDURE — 99203 OFFICE O/P NEW LOW 30 MIN: CPT | Performed by: OTOLARYNGOLOGY

## 2023-04-04 RX ORDER — BUPROPION HYDROCHLORIDE 150 MG/1
250 TABLET ORAL EVERY MORNING
COMMUNITY

## 2023-04-04 ASSESSMENT — ENCOUNTER SYMPTOMS
STRIDOR: 0
SHORTNESS OF BREATH: 0
COUGH: 0

## 2023-04-04 NOTE — TELEPHONE ENCOUNTER
Called Dr Samaniego Evelyn office regarding patients high BP readings. Patient said he has post concussion syndrome and has been stressed with unemployment and meetings. Patient denies SOB, chest pain, palpitations. Dr Reagan Lynch aware. Notified nurse Rebecca Vazquez at PCP office regarding BP.

## 2023-04-04 NOTE — TELEPHONE ENCOUNTER
Patient was seen at a Dr visit. Nurse from that office notified PCP that patient blood pressure was elevated at visit. BP were 174/126 and 164/108. Patient states that he thinks his blood pressure is elevated due to his concussion, trying to find at job and stress trying to find a job. Patient states that when he takes a Xanax HTN decreases. Patient states that his blood pressure was 155/110 at home. Please advise.

## 2023-04-05 NOTE — TELEPHONE ENCOUNTER
Attempted to call the patient and patient's phone rings to a busy tone. Will attempt to call later but author had discused with patient that we would start him on amlodipine 5 mg at last visit 2/2 hypertension if continues to be elevated. Unable to record a voicemail.      Electronically signed by Dorothy Maravilla DO on 4/5/2023 at 9:54 AM

## 2023-04-05 NOTE — TELEPHONE ENCOUNTER
Attempted to call patient a second time and immediately goes to voicemail. Unable to leave a message.       Electronically signed by Benjie Jain DO on 4/5/2023 at 11:05 AM

## 2023-04-08 ENCOUNTER — HOSPITAL ENCOUNTER (EMERGENCY)
Age: 53
Discharge: HOME | End: 2023-04-08
Attending: EMERGENCY MEDICINE
Payer: COMMERCIAL

## 2023-04-08 ENCOUNTER — APPOINTMENT (OUTPATIENT)
Dept: CT IMAGING | Age: 53
End: 2023-04-08
Payer: COMMERCIAL

## 2023-04-08 ENCOUNTER — APPOINTMENT (OUTPATIENT)
Dept: GENERAL RADIOLOGY | Age: 53
End: 2023-04-08
Payer: COMMERCIAL

## 2023-04-08 VITALS
BODY MASS INDEX: 29.26 KG/M2 | TEMPERATURE: 97 F | RESPIRATION RATE: 16 BRPM | SYSTOLIC BLOOD PRESSURE: 152 MMHG | DIASTOLIC BLOOD PRESSURE: 102 MMHG | HEIGHT: 72 IN | WEIGHT: 216 LBS | OXYGEN SATURATION: 98 % | HEART RATE: 73 BPM

## 2023-04-08 DIAGNOSIS — R42 DIZZINESS: ICD-10-CM

## 2023-04-08 DIAGNOSIS — R03.0 ELEVATED BLOOD PRESSURE READING: ICD-10-CM

## 2023-04-08 DIAGNOSIS — R07.9 CHEST PAIN, UNSPECIFIED TYPE: Primary | ICD-10-CM

## 2023-04-08 LAB
ALBUMIN SERPL-MCNC: 4.2 G/DL (ref 3.5–5.2)
ALP SERPL-CCNC: 27 U/L (ref 40–129)
ALT SERPL-CCNC: 20 U/L (ref 0–40)
ANION GAP SERPL CALCULATED.3IONS-SCNC: 10 MMOL/L (ref 7–16)
AST SERPL-CCNC: 33 U/L (ref 0–39)
BASOPHILS # BLD: 0.02 E9/L (ref 0–0.2)
BASOPHILS NFR BLD: 0.4 % (ref 0–2)
BILIRUB SERPL-MCNC: 0.4 MG/DL (ref 0–1.2)
BUN SERPL-MCNC: 11 MG/DL (ref 6–20)
CALCIUM SERPL-MCNC: 10 MG/DL (ref 8.6–10.2)
CHLORIDE SERPL-SCNC: 101 MMOL/L (ref 98–107)
CO2 SERPL-SCNC: 23 MMOL/L (ref 22–29)
CREAT SERPL-MCNC: 0.9 MG/DL (ref 0.7–1.2)
EKG ATRIAL RATE: 66 BPM
EKG P AXIS: 40 DEGREES
EKG P-R INTERVAL: 204 MS
EKG Q-T INTERVAL: 364 MS
EKG QRS DURATION: 84 MS
EKG QTC CALCULATION (BAZETT): 381 MS
EKG R AXIS: 9 DEGREES
EKG T AXIS: 52 DEGREES
EKG VENTRICULAR RATE: 66 BPM
EOSINOPHIL # BLD: 0.12 E9/L (ref 0.05–0.5)
EOSINOPHIL NFR BLD: 2.1 % (ref 0–6)
ERYTHROCYTE [DISTWIDTH] IN BLOOD BY AUTOMATED COUNT: 13.4 FL (ref 11.5–15)
GLUCOSE SERPL-MCNC: 106 MG/DL (ref 74–99)
HCT VFR BLD AUTO: 53.1 % (ref 37–54)
HGB BLD-MCNC: 18.3 G/DL (ref 12.5–16.5)
IMM GRANULOCYTES # BLD: 0.01 E9/L
IMM GRANULOCYTES NFR BLD: 0.2 % (ref 0–5)
LYMPHOCYTES # BLD: 2.35 E9/L (ref 1.5–4)
LYMPHOCYTES NFR BLD: 41.4 % (ref 20–42)
MAGNESIUM SERPL-MCNC: 2.1 MG/DL (ref 1.6–2.6)
MCH RBC QN AUTO: 31.3 PG (ref 26–35)
MCHC RBC AUTO-ENTMCNC: 34.5 % (ref 32–34.5)
MCV RBC AUTO: 90.9 FL (ref 80–99.9)
MONOCYTES # BLD: 0.49 E9/L (ref 0.1–0.95)
MONOCYTES NFR BLD: 8.6 % (ref 2–12)
NEUTROPHILS # BLD: 2.68 E9/L (ref 1.8–7.3)
NEUTS SEG NFR BLD: 47.3 % (ref 43–80)
PLATELET # BLD AUTO: 229 E9/L (ref 130–450)
PMV BLD AUTO: 9.3 FL (ref 7–12)
POTASSIUM SERPL-SCNC: 5.2 MMOL/L (ref 3.5–5)
PROT SERPL-MCNC: 7.7 G/DL (ref 6.4–8.3)
RBC # BLD AUTO: 5.84 E12/L (ref 3.8–5.8)
REASON FOR REJECTION: NORMAL
REJECTED TEST: NORMAL
SODIUM SERPL-SCNC: 134 MMOL/L (ref 132–146)
TROPONIN, HIGH SENSITIVITY: 7 NG/L (ref 0–11)
TROPONIN, HIGH SENSITIVITY: 7 NG/L (ref 0–11)
TSH SERPL-MCNC: 1.54 UIU/ML (ref 0.27–4.2)
WBC # BLD: 5.7 E9/L (ref 4.5–11.5)

## 2023-04-08 PROCEDURE — 84443 ASSAY THYROID STIM HORMONE: CPT

## 2023-04-08 PROCEDURE — 80053 COMPREHEN METABOLIC PANEL: CPT

## 2023-04-08 PROCEDURE — 84484 ASSAY OF TROPONIN QUANT: CPT

## 2023-04-08 PROCEDURE — 85025 COMPLETE CBC W/AUTO DIFF WBC: CPT

## 2023-04-08 PROCEDURE — 71045 X-RAY EXAM CHEST 1 VIEW: CPT

## 2023-04-08 PROCEDURE — 6360000002 HC RX W HCPCS: Performed by: STUDENT IN AN ORGANIZED HEALTH CARE EDUCATION/TRAINING PROGRAM

## 2023-04-08 PROCEDURE — 2580000003 HC RX 258: Performed by: STUDENT IN AN ORGANIZED HEALTH CARE EDUCATION/TRAINING PROGRAM

## 2023-04-08 PROCEDURE — 83735 ASSAY OF MAGNESIUM: CPT

## 2023-04-08 PROCEDURE — 93005 ELECTROCARDIOGRAM TRACING: CPT | Performed by: EMERGENCY MEDICINE

## 2023-04-08 PROCEDURE — 70450 CT HEAD/BRAIN W/O DYE: CPT

## 2023-04-08 RX ORDER — LORAZEPAM 2 MG/ML
0.5 INJECTION INTRAMUSCULAR ONCE
Status: COMPLETED | OUTPATIENT
Start: 2023-04-08 | End: 2023-04-08

## 2023-04-08 RX ORDER — 0.9 % SODIUM CHLORIDE 0.9 %
1000 INTRAVENOUS SOLUTION INTRAVENOUS ONCE
Status: COMPLETED | OUTPATIENT
Start: 2023-04-08 | End: 2023-04-08

## 2023-04-08 RX ADMIN — LORAZEPAM 0.5 MG: 2 INJECTION INTRAMUSCULAR; INTRAVENOUS at 11:44

## 2023-04-08 RX ADMIN — SODIUM CHLORIDE 1000 ML: 9 INJECTION, SOLUTION INTRAVENOUS at 13:12

## 2023-04-08 NOTE — ED PROVIDER NOTES
and Pertinent negatives as per HPI. SURGICAL HISTORY   No past surgical history on file. [address]       Discharge Medication List as of 4/8/2023  6:01 PM        CONTINUE these medications which have NOT CHANGED    Details   buPROPion (WELLBUTRIN XL) 150 MG extended release tablet Take 250 mg by mouth every morning *1-2 tab dailyHistorical Med      omeprazole (PRILOSEC) 40 MG delayed release capsule TAKE 1 CAPSULE BY MOUTH 30 MINUTES BEFORE A MEAL DAILY FOR 6 WEEKSHistorical Med      pentoxifylline (TRENTAL) 400 MG extended release tablet TAKE ONE TABLET BY MOUTH TWO TIMES DAILYHistorical Med      albuterol sulfate HFA (VENTOLIN HFA) 108 (90 Base) MCG/ACT inhaler Inhale 2 puffs into the lungs 4 times daily as needed for Wheezing, Disp-54 g, R-1Normal      tadalafil (CIALIS) 5 MG tablet Historical Med      azelastine (ASTELIN) 0.1 % nasal spray Historical Med      ibuprofen (IBU) 800 MG tablet Take 1 tablet by mouth every 8 hours as needed for Pain Take with food. , Disp-14 tablet, R-0Normal      nystatin-triamcinolone (MYCOLOG II) 202306-5.1 UNIT/GM-% cream Apply topically 2 times daily. , Disp-60 g, R-3, Normal      ciclopirox (PENLAC) 8 % solution Apply topically daily to affected nails. , Disp-6 mL, R-2, Normal      zolpidem (AMBIEN) 10 MG tablet Thru Dr in [facility]      acetaminophen (TYLENOL) 500 MG tablet Take 1 tablet by mouth 4 times daily as needed for Pain, Disp-120 tablet, R-0Normal      bictegravir-emtricitab-tenofovir alafenamide (BIKTARVY) -25 MG TABS per tablet Take 1 tablet by mouth dailyHistorical Med      testosterone cypionate (DEPOTESTOTERONE CYPIONATE) 200 MG/ML injection Historical Med      ALPRAZolam (XANAX) 1 MG tablet Historical Med             ALLERGIES     Patient has no known allergies.     FAMILYHISTORY       Family History   Problem Relation Age of Onset    Diabetes Maternal Grandmother         SOCIAL HISTORY       Social History     Tobacco Use

## 2023-06-27 ENCOUNTER — TELEPHONE (OUTPATIENT)
Dept: ADMINISTRATIVE | Age: 53
End: 2023-06-27

## 2023-06-27 DIAGNOSIS — B35.1 ONYCHOMYCOSIS: ICD-10-CM

## 2023-06-27 DIAGNOSIS — B35.3 TINEA PEDIS OF BOTH FEET: ICD-10-CM

## 2023-07-03 ENCOUNTER — PROCEDURE VISIT (OUTPATIENT)
Dept: PODIATRY | Age: 53
End: 2023-07-03
Payer: MEDICAID

## 2023-07-03 VITALS — WEIGHT: 216 LBS | HEIGHT: 72 IN | BODY MASS INDEX: 29.26 KG/M2

## 2023-07-03 DIAGNOSIS — B35.3 TINEA PEDIS OF BOTH FEET: Primary | ICD-10-CM

## 2023-07-03 DIAGNOSIS — M20.42 HAMMER TOES OF BOTH FEET: ICD-10-CM

## 2023-07-03 DIAGNOSIS — M20.41 HAMMER TOES OF BOTH FEET: ICD-10-CM

## 2023-07-03 DIAGNOSIS — B35.1 ONYCHOMYCOSIS: ICD-10-CM

## 2023-07-03 PROCEDURE — 99213 OFFICE O/P EST LOW 20 MIN: CPT | Performed by: PODIATRIST

## 2023-07-03 NOTE — PROGRESS NOTES
Patient in today for nail care. Patient does not have any complaints of pain at this time.  Patient's PCP is Maliha Rocha DO date of last ov 3/15/23          Golden Yap LPN

## 2023-07-03 NOTE — PROGRESS NOTES
7/3/23     Elvia Carpenter    : 1970   Sex: male    Age: 46 y.o. Patient's PCP/Provider is:  Mariama Bee DO    Subjective:  Patient is seen today for follow-up regarding nail dystrophy issues and tinea pedis issues to both lower extremities. Patient has noticed improvement with both topical medications being applied. He denies any additional issues at this time. No other abnormalities noted. Chief Complaint   Patient presents with    Nail Problem     Nail care       ROS:  Const: Positives and pertinent negatives as per HPI. Musculo: Denies symptoms other than stated above. Neuro: Denies symptoms other than stated above. Skin: Denies symptoms other than stated above. Current Medications:    Current Outpatient Medications:     ciclopirox (PENLAC) 8 % solution, Apply topically daily to affected nails. , Disp: 6 mL, Rfl: 2    amLODIPine (NORVASC) 5 MG tablet, Take 1 tablet by mouth daily, Disp: 30 tablet, Rfl: 2    buPROPion (WELLBUTRIN XL) 150 MG extended release tablet, Take 250 mg by mouth every morning *1-2 tab daily, Disp: , Rfl:     omeprazole (PRILOSEC) 40 MG delayed release capsule, TAKE 1 CAPSULE BY MOUTH 30 MINUTES BEFORE A MEAL DAILY FOR 6 WEEKS, Disp: , Rfl:     pentoxifylline (TRENTAL) 400 MG extended release tablet, TAKE ONE TABLET BY MOUTH TWO TIMES DAILY, Disp: , Rfl:     albuterol sulfate HFA (VENTOLIN HFA) 108 (90 Base) MCG/ACT inhaler, Inhale 2 puffs into the lungs 4 times daily as needed for Wheezing, Disp: 54 g, Rfl: 1    tadalafil (CIALIS) 5 MG tablet, , Disp: , Rfl:     azelastine (ASTELIN) 0.1 % nasal spray, , Disp: , Rfl:     ibuprofen (IBU) 800 MG tablet, Take 1 tablet by mouth every 8 hours as needed for Pain Take with food. , Disp: 14 tablet, Rfl: 0    nystatin-triamcinolone (MYCOLOG II) 916527-3.0 UNIT/GM-% cream, Apply topically 2 times daily. , Disp: 60 g, Rfl: 3    zolpidem (AMBIEN) 10 MG tablet, Thru Dr in [facility], Disp: , Rfl:     acetaminophen

## 2023-07-21 ENCOUNTER — TELEPHONE (OUTPATIENT)
Dept: PRIMARY CARE CLINIC | Age: 53
End: 2023-07-21

## 2023-07-21 NOTE — TELEPHONE ENCOUNTER
----- Message from Genevieve Alejo sent at 7/21/2023  2:17 PM EDT -----  Subject: Appointment Request    Reason for Call: New Patient/New to Provider Appointment needed: New   Patient Request Appointment    QUESTIONS    Reason for appointment request? Requested Provider unavailable -   Merged with [facility]     Additional Information for Provider? Patient would like to become a NP of   Dr. Sixto Ward. His mother Wilmar Villela is currently a patient.  Please call   patient and advise.  ---------------------------------------------------------------------------  --------------  Shira PISANO  [phone]; OK to leave message on voicemail  ---------------------------------------------------------------------------  --------------  SCRIPT ANSWERS

## 2023-07-25 ENCOUNTER — TELEPHONE (OUTPATIENT)
Dept: INTERNAL MEDICINE | Age: 53
End: 2023-07-25

## 2023-07-25 DIAGNOSIS — H91.90 HEARING LOSS, UNSPECIFIED HEARING LOSS TYPE, UNSPECIFIED LATERALITY: Primary | ICD-10-CM

## 2023-07-25 NOTE — TELEPHONE ENCOUNTER
Patient wants a referral for  [facility]) audiology.  Also requesting an appt for 8/2/23 at 230 pm.

## 2023-08-02 ENCOUNTER — OFFICE VISIT (OUTPATIENT)
Dept: INTERNAL MEDICINE | Age: 53
End: 2023-08-02

## 2023-08-02 VITALS
HEART RATE: 73 BPM | DIASTOLIC BLOOD PRESSURE: 93 MMHG | HEIGHT: 72 IN | OXYGEN SATURATION: 97 % | TEMPERATURE: 97.3 F | BODY MASS INDEX: 29.66 KG/M2 | RESPIRATION RATE: 20 BRPM | SYSTOLIC BLOOD PRESSURE: 135 MMHG | WEIGHT: 219 LBS

## 2023-08-02 DIAGNOSIS — F32.A DEPRESSION, UNSPECIFIED DEPRESSION TYPE: ICD-10-CM

## 2023-08-02 DIAGNOSIS — G31.84 MCI (MILD COGNITIVE IMPAIRMENT): ICD-10-CM

## 2023-08-02 DIAGNOSIS — R41.3 MEMORY LOSS: ICD-10-CM

## 2023-08-02 DIAGNOSIS — A53.9 ACQUIRED SYPHILIS: ICD-10-CM

## 2023-08-02 DIAGNOSIS — N52.9 ERECTILE DYSFUNCTION, UNSPECIFIED ERECTILE DYSFUNCTION TYPE: ICD-10-CM

## 2023-08-02 DIAGNOSIS — K21.00 GASTROESOPHAGEAL REFLUX DISEASE WITH ESOPHAGITIS WITHOUT HEMORRHAGE: Primary | ICD-10-CM

## 2023-08-02 DIAGNOSIS — Z21 ASYMPTOMATIC HIV INFECTION, WITH NO HISTORY OF HIV-RELATED ILLNESS (HCC): ICD-10-CM

## 2023-08-02 RX ORDER — BUPROPION HYDROCHLORIDE 200 MG/1
200 TABLET, EXTENDED RELEASE ORAL ONCE
COMMUNITY

## 2023-08-02 RX ORDER — AMLODIPINE BESYLATE 10 MG/1
10 TABLET ORAL DAILY
Qty: 30 TABLET | Refills: 1 | Status: SHIPPED | OUTPATIENT
Start: 2023-08-02

## 2023-08-02 RX ORDER — PAROXETINE 10 MG/1
10 TABLET, FILM COATED ORAL EVERY MORNING
COMMUNITY

## 2023-08-02 RX ORDER — OMEPRAZOLE 40 MG/1
CAPSULE, DELAYED RELEASE ORAL
Qty: 90 CAPSULE | Refills: 1 | Status: SHIPPED
Start: 2023-08-02 | End: 2023-08-03 | Stop reason: SDUPTHER

## 2023-08-02 RX ORDER — TESTOSTERONE CYPIONATE 200 MG/ML
INJECTION, SOLUTION INTRAMUSCULAR
Status: CANCELLED | OUTPATIENT
Start: 2023-08-02

## 2023-08-02 ASSESSMENT — PATIENT HEALTH QUESTIONNAIRE - PHQ9
5. POOR APPETITE OR OVEREATING: NEARLY EVERY DAY
9. THOUGHTS THAT YOU WOULD BE BETTER OFF DEAD, OR OF HURTING YOURSELF: NOT AT ALL
1. LITTLE INTEREST OR PLEASURE IN DOING THINGS: NEARLY EVERY DAY
SUM OF ALL RESPONSES TO PHQ QUESTIONS 1-9: 23
8. MOVING OR SPEAKING SO SLOWLY THAT OTHER PEOPLE COULD HAVE NOTICED. OR THE OPPOSITE, BEING SO FIGETY OR RESTLESS THAT YOU HAVE BEEN MOVING AROUND A LOT MORE THAN USUAL: 3
4. FEELING TIRED OR HAVING LITTLE ENERGY: 3
1. LITTLE INTEREST OR PLEASURE IN DOING THINGS: 3
6. FEELING BAD ABOUT YOURSELF - OR THAT YOU ARE A FAILURE OR HAVE LET YOURSELF OR YOUR FAMILY DOWN: 3
SUM OF ALL RESPONSES TO PHQ QUESTIONS 1-9: 23
7. TROUBLE CONCENTRATING ON THINGS, SUCH AS READING THE NEWSPAPER OR WATCHING TELEVISION: NEARLY EVERY DAY
3. TROUBLE FALLING OR STAYING ASLEEP: NEARLY EVERY DAY
SUM OF ALL RESPONSES TO PHQ9 QUESTIONS 1 & 2: 5
3. TROUBLE FALLING OR STAYING ASLEEP: 3
2. FEELING DOWN, DEPRESSED OR HOPELESS: 2
4. FEELING TIRED OR HAVING LITTLE ENERGY: NEARLY EVERY DAY
6. FEELING BAD ABOUT YOURSELF - OR THAT YOU ARE A FAILURE OR HAVE LET YOURSELF OR YOUR FAMILY DOWN: NEARLY EVERY DAY
2. FEELING DOWN, DEPRESSED OR HOPELESS: MORE THAN HALF THE DAYS
10. IF YOU CHECKED OFF ANY PROBLEMS, HOW DIFFICULT HAVE THESE PROBLEMS MADE IT FOR YOU TO DO YOUR WORK, TAKE CARE OF THINGS AT HOME, OR GET ALONG WITH OTHER PEOPLE: 3
9. THOUGHTS THAT YOU WOULD BE BETTER OFF DEAD, OR OF HURTING YOURSELF: 0
SUM OF ALL RESPONSES TO PHQ QUESTIONS 1-9: 23
10. IF YOU CHECKED OFF ANY PROBLEMS, HOW DIFFICULT HAVE THESE PROBLEMS MADE IT FOR YOU TO DO YOUR WORK, TAKE CARE OF THINGS AT HOME, OR GET ALONG WITH OTHER PEOPLE: EXTREMELY DIFFICULT
SUM OF ALL RESPONSES TO PHQ QUESTIONS 1-9: 23
5. POOR APPETITE OR OVEREATING: 3
8. MOVING OR SPEAKING SO SLOWLY THAT OTHER PEOPLE COULD HAVE NOTICED. OR THE OPPOSITE - BEING SO FIDGETY OR RESTLESS THAT YOU HAVE BEEN MOVING AROUND A LOT MORE THAN USUAL: NEARLY EVERY DAY
7. TROUBLE CONCENTRATING ON THINGS, SUCH AS READING THE NEWSPAPER OR WATCHING TELEVISION: 3
SUM OF ALL RESPONSES TO PHQ QUESTIONS 1-9: 23

## 2023-08-02 ASSESSMENT — COLUMBIA-SUICIDE SEVERITY RATING SCALE - C-SSRS
2. IN THE PAST MONTH, HAVE YOU ACTUALLY HAD ANY THOUGHTS OF KILLING YOURSELF?: NO
6. IN YOUR LIFETIME, HAVE YOU EVER DONE ANYTHING, STARTED TO DO ANYTHING, OR PREPARED TO DO ANYTHING TO END YOUR LIFE?: NO
1. IN THE PAST MONTH, HAVE YOU WISHED YOU WERE DEAD OR WISHED YOU COULD GO TO SLEEP AND NOT WAKE UP?: NO

## 2023-08-02 ASSESSMENT — ENCOUNTER SYMPTOMS
CONSTIPATION: 0
VOMITING: 0
SINUS PAIN: 0
DIARRHEA: 0
ABDOMINAL PAIN: 0
COUGH: 0
WHEEZING: 0
SHORTNESS OF BREATH: 0
NAUSEA: 0

## 2023-08-03 ENCOUNTER — HOSPITAL ENCOUNTER (OUTPATIENT)
Age: 53
Discharge: HOME | End: 2023-08-03
Payer: MEDICAID

## 2023-08-03 ENCOUNTER — TELEPHONE (OUTPATIENT)
Dept: INTERNAL MEDICINE | Age: 53
End: 2023-08-03

## 2023-08-03 DIAGNOSIS — G31.84 MCI (MILD COGNITIVE IMPAIRMENT): ICD-10-CM

## 2023-08-03 DIAGNOSIS — F32.A DEPRESSION, UNSPECIFIED DEPRESSION TYPE: ICD-10-CM

## 2023-08-03 DIAGNOSIS — Z21 ASYMPTOMATIC HIV INFECTION, WITH NO HISTORY OF HIV-RELATED ILLNESS (HCC): ICD-10-CM

## 2023-08-03 DIAGNOSIS — A53.9 ACQUIRED SYPHILIS: ICD-10-CM

## 2023-08-03 DIAGNOSIS — N52.9 ERECTILE DYSFUNCTION, UNSPECIFIED ERECTILE DYSFUNCTION TYPE: ICD-10-CM

## 2023-08-03 DIAGNOSIS — R41.3 MEMORY LOSS: ICD-10-CM

## 2023-08-03 DIAGNOSIS — K21.00 GASTROESOPHAGEAL REFLUX DISEASE WITH ESOPHAGITIS WITHOUT HEMORRHAGE: ICD-10-CM

## 2023-08-03 LAB
ALBUMIN SERPL-MCNC: 4.4 G/DL (ref 3.5–5.2)
ALP SERPL-CCNC: 28 U/L (ref 40–129)
ALT SERPL-CCNC: 16 U/L (ref 0–40)
AMMONIA PLAS-SCNC: 61 UMOL/L (ref 16–60)
ANION GAP SERPL CALCULATED.3IONS-SCNC: 8 MMOL/L (ref 7–16)
AST SERPL-CCNC: 19 U/L (ref 0–39)
BASOPHILS # BLD: 0.02 K/UL (ref 0–0.2)
BASOPHILS NFR BLD: 0 % (ref 0–2)
BILIRUB SERPL-MCNC: 0.4 MG/DL (ref 0–1.2)
BUN SERPL-MCNC: 16 MG/DL (ref 6–20)
CALCIUM SERPL-MCNC: 9.6 MG/DL (ref 8.6–10.2)
CHLORIDE SERPL-SCNC: 102 MMOL/L (ref 98–107)
CHOLEST SERPL-MCNC: 239 MG/DL
CO2 SERPL-SCNC: 25 MMOL/L (ref 22–29)
CREAT SERPL-MCNC: 1 MG/DL (ref 0.7–1.2)
EOSINOPHIL # BLD: 0.06 K/UL (ref 0.05–0.5)
EOSINOPHILS RELATIVE PERCENT: 1 % (ref 0–6)
ERYTHROCYTE [DISTWIDTH] IN BLOOD BY AUTOMATED COUNT: 12.8 % (ref 11.5–15)
FOLATE SERPL-MCNC: 13.6 NG/ML (ref 4.8–24.2)
GFR SERPL CREATININE-BSD FRML MDRD: >60 ML/MIN/1.73M2
GLUCOSE SERPL-MCNC: 103 MG/DL (ref 74–99)
HBA1C MFR BLD: 5.4 % (ref 4–5.6)
HCT VFR BLD AUTO: 44.3 % (ref 37–54)
HDLC SERPL-MCNC: 59 MG/DL
HGB BLD-MCNC: 15.3 G/DL (ref 12.5–16.5)
IMM GRANULOCYTES # BLD AUTO: <0.03 K/UL (ref 0–0.58)
IMM GRANULOCYTES NFR BLD: 0 % (ref 0–5)
LDLC SERPL CALC-MCNC: 151 MG/DL
LYMPHOCYTES NFR BLD: 2.17 K/UL (ref 1.5–4)
LYMPHOCYTES RELATIVE PERCENT: 43 % (ref 20–42)
MCH RBC QN AUTO: 31.6 PG (ref 26–35)
MCHC RBC AUTO-ENTMCNC: 34.5 G/DL (ref 32–34.5)
MCV RBC AUTO: 91.5 FL (ref 80–99.9)
MONOCYTES NFR BLD: 0.4 K/UL (ref 0.1–0.95)
MONOCYTES NFR BLD: 8 % (ref 2–12)
NEUTROPHILS NFR BLD: 48 % (ref 43–80)
NEUTS SEG NFR BLD: 2.44 K/UL (ref 1.8–7.3)
PLATELET # BLD AUTO: 227 K/UL (ref 130–450)
PMV BLD AUTO: 9.6 FL (ref 7–12)
POTASSIUM SERPL-SCNC: 4.2 MMOL/L (ref 3.5–5)
PROT SERPL-MCNC: 7.3 G/DL (ref 6.4–8.3)
PSA SERPL-MCNC: 1.35 NG/ML (ref 0–4)
RBC # BLD AUTO: 4.84 M/UL (ref 3.8–5.8)
SODIUM SERPL-SCNC: 135 MMOL/L (ref 132–146)
TRIGL SERPL-MCNC: 147 MG/DL
TSH SERPL DL<=0.05 MIU/L-ACNC: 1.94 UIU/ML (ref 0.27–4.2)
VIT B12 SERPL-MCNC: 960 PG/ML (ref 211–946)
VLDLC SERPL CALC-MCNC: 29 MG/DL
WBC OTHER # BLD: 5.1 K/UL (ref 4.5–11.5)

## 2023-08-03 PROCEDURE — 80053 COMPREHEN METABOLIC PANEL: CPT

## 2023-08-03 PROCEDURE — 86592 SYPHILIS TEST NON-TREP QUAL: CPT

## 2023-08-03 PROCEDURE — 36415 COLL VENOUS BLD VENIPUNCTURE: CPT

## 2023-08-03 PROCEDURE — 82140 ASSAY OF AMMONIA: CPT

## 2023-08-03 PROCEDURE — 82746 ASSAY OF FOLIC ACID SERUM: CPT

## 2023-08-03 PROCEDURE — 83036 HEMOGLOBIN GLYCOSYLATED A1C: CPT

## 2023-08-03 PROCEDURE — 85025 COMPLETE CBC W/AUTO DIFF WBC: CPT

## 2023-08-03 PROCEDURE — G0103 PSA SCREENING: HCPCS

## 2023-08-03 PROCEDURE — 80061 LIPID PANEL: CPT

## 2023-08-03 PROCEDURE — 84443 ASSAY THYROID STIM HORMONE: CPT

## 2023-08-03 PROCEDURE — 82607 VITAMIN B-12: CPT

## 2023-08-03 RX ORDER — OMEPRAZOLE 40 MG/1
CAPSULE, DELAYED RELEASE ORAL
Qty: 90 CAPSULE | Refills: 1 | Status: SHIPPED | OUTPATIENT
Start: 2023-08-03

## 2023-08-03 NOTE — TELEPHONE ENCOUNTER
Pharmacist from [employer]  called questioning quantity of Omeprazole order with instructions given and  quantity given can you please call them for clarification

## 2023-08-04 LAB — RPR SER QL: NONREACTIVE

## 2023-08-07 ENCOUNTER — TELEPHONE (OUTPATIENT)
Dept: INTERNAL MEDICINE | Age: 53
End: 2023-08-07

## 2023-08-07 NOTE — TELEPHONE ENCOUNTER
Called and left message detailing normal lab work for the patient and need to have the MRI performed. Discussed likely need to treat cholesterol in the future.       Electronically signed by Franca Ramsey DO on 8/7/2023 at 11:06 AM

## 2023-08-11 ENCOUNTER — OFFICE VISIT (OUTPATIENT)
Dept: ORTHOPEDIC SURGERY | Age: 53
End: 2023-08-11

## 2023-08-11 VITALS — BODY MASS INDEX: 29.03 KG/M2 | WEIGHT: 219 LBS | HEIGHT: 73 IN

## 2023-08-11 DIAGNOSIS — F07.81 POST CONCUSSION SYNDROME: Primary | ICD-10-CM

## 2023-08-11 RX ORDER — MECLIZINE HYDROCHLORIDE 25 MG/1
25 TABLET ORAL 3 TIMES DAILY PRN
Qty: 90 TABLET | Refills: 0 | Status: SHIPPED | OUTPATIENT
Start: 2023-08-11 | End: 2023-09-10

## 2023-08-11 RX ORDER — AMITRIPTYLINE HYDROCHLORIDE 25 MG/1
25 TABLET, FILM COATED ORAL NIGHTLY
Qty: 30 TABLET | Refills: 0 | Status: SHIPPED | OUTPATIENT
Start: 2023-08-11

## 2023-08-11 NOTE — PROGRESS NOTES
[facility]  PRIMARY CARE SPORTS MEDICINE  DATE OF VISIT : 2023    Patient : Benjie Zamora  Age : 46 y.o.  : 1970  MRN : [id number]   ______________________________________________________________________    Chief Complaint :   Chief Complaint   Patient presents with    Concussion      Experiencing lots of confession  see Therapist once a week . Seems to come on heavy when stressed    Eye sight has changed        HPI : Benjie Zamora is 46 y.o. male who presented to the clinic today for medical management of postconcussion syndrome. Patient has headaches and dizziness that occur sporadically throughout the day. Symptoms are not improved with OTC medications. Patient is in vestibular ocular therapy which has improved his symptoms of dizziness without complete resolution. Review of Systems    Headache Yes   Nausea  No   Vomiting No   Balance problems Yes   Dizziness Yes   Fatigue Yes   Trouble falling asleep Yes   Sleeping more than usual Yes   Sleeping less than usual No   Drowsiness Yes   Sensitivity to light No   Sensitivity to noise No   Irritability Yes   Sadness Yes   Nervousness Yes   Feeling more emotional Yes   Numbness or tingling No   Feeling slowed down Yes   Feeling mentally foggy Yes   Difficulty concentrating Yes   Difficulty remembering Yes   Visual problems No       Past Medical History :  Past Medical History:   Diagnosis Date    HIV (human immunodeficiency virus infection) ([address] [facility])     Monkeypox 2022    Post concussion syndrome      No past surgical history on file.     Allergies :   No Known Allergies    Medication List :    Current Outpatient Medications   Medication Sig Dispense Refill    omeprazole (PRILOSEC) 40 MG delayed release capsule TAKE 1 CAPSULE BY MOUTH 30 MINUTES BEFORE A MEAL DAILY 90 capsule 1    buPROPion (WELLBUTRIN SR) 200 MG extended release tablet Take 1 tablet by mouth once      PARoxetine (PAXIL) 10 MG tablet Take 1 tablet by mouth every morning

## 2023-08-15 ENCOUNTER — HOSPITAL ENCOUNTER (OUTPATIENT)
Dept: AUDIOLOGY | Age: 53
Discharge: HOME | End: 2023-08-15
Payer: MEDICAID

## 2023-08-15 PROCEDURE — 92567 TYMPANOMETRY: CPT | Performed by: AUDIOLOGIST

## 2023-08-15 PROCEDURE — 92557 COMPREHENSIVE HEARING TEST: CPT | Performed by: AUDIOLOGIST

## 2023-08-23 ENCOUNTER — HOSPITAL ENCOUNTER (OUTPATIENT)
Dept: MRI IMAGING | Age: 53
Discharge: HOME | End: 2023-08-25
Attending: INTERNAL MEDICINE
Payer: MEDICAID

## 2023-08-23 DIAGNOSIS — G31.84 MCI (MILD COGNITIVE IMPAIRMENT): ICD-10-CM

## 2023-08-23 DIAGNOSIS — Z21 ASYMPTOMATIC HIV INFECTION, WITH NO HISTORY OF HIV-RELATED ILLNESS (HCC): ICD-10-CM

## 2023-08-23 DIAGNOSIS — R41.3 MEMORY LOSS: ICD-10-CM

## 2023-08-23 DIAGNOSIS — N52.9 ERECTILE DYSFUNCTION, UNSPECIFIED ERECTILE DYSFUNCTION TYPE: ICD-10-CM

## 2023-08-23 DIAGNOSIS — K21.00 GASTROESOPHAGEAL REFLUX DISEASE WITH ESOPHAGITIS WITHOUT HEMORRHAGE: ICD-10-CM

## 2023-08-23 DIAGNOSIS — F32.A DEPRESSION, UNSPECIFIED DEPRESSION TYPE: ICD-10-CM

## 2023-08-23 DIAGNOSIS — A53.9 ACQUIRED SYPHILIS: ICD-10-CM

## 2023-08-23 PROCEDURE — 70553 MRI BRAIN STEM W/O & W/DYE: CPT

## 2023-08-23 PROCEDURE — A9579 GAD-BASE MR CONTRAST NOS,1ML: HCPCS | Performed by: RADIOLOGY

## 2023-08-23 PROCEDURE — 6360000004 HC RX CONTRAST MEDICATION: Performed by: RADIOLOGY

## 2023-08-23 RX ADMIN — GADOTERIDOL 20 ML: 279.3 INJECTION, SOLUTION INTRAVENOUS at 16:22

## 2023-09-06 SDOH — ECONOMIC STABILITY: HOUSING INSECURITY
IN THE LAST 12 MONTHS, WAS THERE A TIME WHEN YOU DID NOT HAVE A STEADY PLACE TO SLEEP OR SLEPT IN A SHELTER (INCLUDING NOW)?: PATIENT REFUSED

## 2023-09-06 SDOH — ECONOMIC STABILITY: FOOD INSECURITY: WITHIN THE PAST 12 MONTHS, THE FOOD YOU BOUGHT JUST DIDN'T LAST AND YOU DIDN'T HAVE MONEY TO GET MORE.: PATIENT DECLINED

## 2023-09-06 SDOH — ECONOMIC STABILITY: FOOD INSECURITY: WITHIN THE PAST 12 MONTHS, YOU WORRIED THAT YOUR FOOD WOULD RUN OUT BEFORE YOU GOT MONEY TO BUY MORE.: PATIENT DECLINED

## 2023-09-06 SDOH — ECONOMIC STABILITY: INCOME INSECURITY: HOW HARD IS IT FOR YOU TO PAY FOR THE VERY BASICS LIKE FOOD, HOUSING, MEDICAL CARE, AND HEATING?: PATIENT DECLINED

## 2023-09-06 SDOH — ECONOMIC STABILITY: TRANSPORTATION INSECURITY
IN THE PAST 12 MONTHS, HAS LACK OF TRANSPORTATION KEPT YOU FROM MEETINGS, WORK, OR FROM GETTING THINGS NEEDED FOR DAILY LIVING?: PATIENT DECLINED

## 2023-09-07 ENCOUNTER — OFFICE VISIT (OUTPATIENT)
Dept: INTERNAL MEDICINE | Age: 53
End: 2023-09-07

## 2023-09-07 VITALS
DIASTOLIC BLOOD PRESSURE: 96 MMHG | WEIGHT: 218 LBS | OXYGEN SATURATION: 96 % | BODY MASS INDEX: 28.89 KG/M2 | HEIGHT: 73 IN | RESPIRATION RATE: 20 BRPM | SYSTOLIC BLOOD PRESSURE: 133 MMHG | TEMPERATURE: 97.5 F | HEART RATE: 71 BPM

## 2023-09-07 DIAGNOSIS — I10 HYPERTENSION, UNSPECIFIED TYPE: ICD-10-CM

## 2023-09-07 DIAGNOSIS — K21.00 GASTROESOPHAGEAL REFLUX DISEASE WITH ESOPHAGITIS WITHOUT HEMORRHAGE: Primary | ICD-10-CM

## 2023-09-07 ASSESSMENT — ENCOUNTER SYMPTOMS
DIARRHEA: 0
ABDOMINAL PAIN: 0
COUGH: 0
NAUSEA: 0
SHORTNESS OF BREATH: 0
VOMITING: 0
WHEEZING: 0
CONSTIPATION: 0
SINUS PAIN: 0

## 2023-09-07 NOTE — PROGRESS NOTES
[id number] [facility] Internal Medicine      SUBJECTIVE:  Arsalan Childers (:  1970) is a 46 y.o. male here for evaluation of the following chief complaint(s):  1 Month Follow-Up and Hypertension (Patient monitored  blood pressure for 1 month )    Reviewed patients' blood work and lab testing; patient states taht when his blood is controlled; his brain fog and confusion is improved; his blood pressure is still elevated from a diastolic perspective but his systolic blood pressure is stable; discussed lifestyle modifications with the patient and if unable to control his diastolic pressure and be weaned from the welbutrin in the next few months then we will increase his blood pressure medication regimen     Review of Systems   Constitutional:  Negative for chills and fever. HENT:  Negative for congestion and sinus pain. Respiratory:  Negative for cough, shortness of breath and wheezing. Cardiovascular:  Negative for chest pain, palpitations and leg swelling. Gastrointestinal:  Negative for abdominal pain, constipation, diarrhea, nausea and vomiting. Genitourinary:  Negative for dysuria and frequency. Musculoskeletal:  Negative for myalgias. Skin:  Negative for rash. Allergic/Immunologic: Negative for environmental allergies. Neurological:  Negative for headaches. Hematological:  Does not bruise/bleed easily. Psychiatric/Behavioral:  Negative for suicidal ideas.       Current Outpatient Medications on File Prior to Visit   Medication Sig Dispense Refill    meclizine (ANTIVERT) 25 MG tablet Take 1 tablet by mouth 3 times daily as needed for Dizziness 90 tablet 0    amitriptyline (ELAVIL) 25 MG tablet Take 1 tablet by mouth nightly 30 tablet 0    omeprazole (PRILOSEC) 40 MG delayed release capsule TAKE 1 CAPSULE BY MOUTH 30 MINUTES BEFORE A MEAL DAILY 90 capsule 1    buPROPion (WELLBUTRIN SR) 200 MG extended release tablet Take 1 tablet by mouth once

## 2023-09-07 NOTE — PATIENT INSTRUCTIONS
Lab Tests to get prior to next Visit  1. None     Changes in Medications  None     Referrals   1. None     Please follow up 6 months with our clinic. Please call if your symptoms worsen or fail to improve.

## 2023-09-08 ENCOUNTER — PATIENT MESSAGE (OUTPATIENT)
Dept: INTERNAL MEDICINE | Age: 53
End: 2023-09-08

## 2023-09-08 DIAGNOSIS — E34.9 TESTOSTERONE INSUFFICIENCY: Primary | ICD-10-CM

## 2023-09-08 NOTE — TELEPHONE ENCOUNTER
This office normally does not prescribe testosterone replacement to patients. Referral to endocrine placed.       Electronically signed by Nichole Rothman DO on 9/8/2023 at 5:11 PM

## 2023-09-21 ENCOUNTER — OFFICE VISIT (OUTPATIENT)
Dept: FAMILY MEDICINE CLINIC | Age: 53
End: 2023-09-21
Payer: MEDICAID

## 2023-09-21 VITALS
BODY MASS INDEX: 30.34 KG/M2 | SYSTOLIC BLOOD PRESSURE: 134 MMHG | HEIGHT: 72 IN | RESPIRATION RATE: 18 BRPM | OXYGEN SATURATION: 96 % | WEIGHT: 224 LBS | HEART RATE: 90 BPM | TEMPERATURE: 97.5 F | DIASTOLIC BLOOD PRESSURE: 94 MMHG

## 2023-09-21 DIAGNOSIS — L02.811 ABSCESS, SCALP: Primary | ICD-10-CM

## 2023-09-21 PROCEDURE — 3080F DIAST BP >= 90 MM HG: CPT | Performed by: PHYSICIAN ASSISTANT

## 2023-09-21 PROCEDURE — 3075F SYST BP GE 130 - 139MM HG: CPT | Performed by: PHYSICIAN ASSISTANT

## 2023-09-21 PROCEDURE — 99203 OFFICE O/P NEW LOW 30 MIN: CPT | Performed by: PHYSICIAN ASSISTANT

## 2023-09-21 RX ORDER — DOXYCYCLINE HYCLATE 100 MG
100 TABLET ORAL 2 TIMES DAILY
Qty: 20 TABLET | Refills: 0 | Status: SHIPPED | OUTPATIENT
Start: 2023-09-21 | End: 2023-10-01

## 2023-09-21 RX ORDER — CEFDINIR 300 MG/1
300 CAPSULE ORAL 2 TIMES DAILY
Qty: 20 CAPSULE | Refills: 0 | Status: SHIPPED | OUTPATIENT
Start: 2023-09-21 | End: 2023-10-01

## 2023-09-21 NOTE — PROGRESS NOTES
evaluated. The patient does not appear to be toxic or lethargic. I discussed differential diagnosis with the patient. I did offer recommend evaluation in the emergency department for CT scanning. The patient declined. We will treat the patient with doxycycline, cefdinir. The patient will be given mupirocin. He is not opposed to anything else topically other than the mupirocin. The patient is not to use any rubbing alcohol. Close follow-up with PCP for further evaluation. The patient is to use warm compresses. The patient is not to poke or prod the area. The patient is to return to express care or go directly to the emergency department should any of the signs or symptoms worsen. The patient is to followup with primary care physician in 2-3 days for repeat evaluation. The patient has no other questions or concerns at this time the patient will be discharged home. Clinical Impression:   Gisela Saucedo was seen today for otalgia. Diagnoses and all orders for this visit:    Abscess, scalp    Other orders  -     doxycycline hyclate (VIBRA-TABS) 100 MG tablet; Take 1 tablet by mouth 2 times daily for 10 days  -     cefdinir (OMNICEF) 300 MG capsule; Take 1 capsule by mouth 2 times daily for 10 days  -     mupirocin (BACTROBAN) 2 % ointment; Apply topically 3 times daily. The patient is to call for any concerns or return if any of the signs or symptoms worsen. The patient is to follow-up with PCP in the next 2-3 days for repeat evaluation repeat assessment or go directly to the emergency department.      SIGNATURE: Justin Amezcua III, PA-C

## 2023-09-22 ENCOUNTER — TELEPHONE (OUTPATIENT)
Dept: INTERNAL MEDICINE | Age: 53
End: 2023-09-22

## 2023-09-22 NOTE — TELEPHONE ENCOUNTER
Pt was seen in walk in clinic. Was told he may need surgery and to follow up with PCP. Pt is calling to keep Dr. Tyrel Banks updated. States he just started antibiotics last night. Feels area now is less painful to touch. Pt instructed per Walk in visit note, he was to follow up with PCP in 2-3 days. Pt instructed to call or send My Chart message on Monday with update. Notified no one in office on Sat or Sunday and if symptoms worsen over the weekend, pt will need to follow up in Urgent Care and or ED. Pt verbalizes understanding. Notified message to be sent to Dr. Tyrel Banks and his nurse.

## 2023-09-28 ENCOUNTER — OFFICE VISIT (OUTPATIENT)
Dept: INTERNAL MEDICINE | Age: 53
End: 2023-09-28

## 2023-09-28 VITALS
RESPIRATION RATE: 20 BRPM | WEIGHT: 222.2 LBS | TEMPERATURE: 97.4 F | HEART RATE: 79 BPM | DIASTOLIC BLOOD PRESSURE: 87 MMHG | OXYGEN SATURATION: 96 % | HEIGHT: 72 IN | BODY MASS INDEX: 30.1 KG/M2 | SYSTOLIC BLOOD PRESSURE: 114 MMHG

## 2023-09-28 DIAGNOSIS — L02.91 ABSCESS: ICD-10-CM

## 2023-09-28 DIAGNOSIS — I10 HYPERTENSION, UNSPECIFIED TYPE: Primary | ICD-10-CM

## 2023-09-28 NOTE — PROGRESS NOTES
[id number] [facility] Internal Medicine      SUBJECTIVE:  Damaris Yañez (:  1970) is a 46 y.o. male here for evaluation of the following chief complaint(s):  Follow-up and Mass (Patient has a lump on the side of his right temple that is painful. Lump has decreased in size. Patient has a few lumps on his head. )    Patient is here for followup on his chronic medical issues and his facial abscess. It is healed and less swelling; no acute issues; patinet is finishing antibiotics. All questions answered. Discussed if it returns or starts to have purulence then we will have ENT or plastic surgery perform I and D. Review of Systems   Constitutional:  Negative for chills and fever. HENT:  Negative for congestion and sinus pain. Respiratory:  Negative for cough, shortness of breath and wheezing. Cardiovascular:  Negative for chest pain, palpitations and leg swelling. Gastrointestinal:  Negative for abdominal pain, constipation, diarrhea, nausea and vomiting. Genitourinary:  Negative for dysuria and frequency. Musculoskeletal:  Negative for myalgias. Skin:  Negative for rash. Allergic/Immunologic: Negative for environmental allergies. Neurological:  Negative for headaches. Hematological:  Does not bruise/bleed easily. Psychiatric/Behavioral:  Negative for suicidal ideas. Current Outpatient Medications on File Prior to Visit   Medication Sig Dispense Refill    doxycycline hyclate (VIBRA-TABS) 100 MG tablet Take 1 tablet by mouth 2 times daily for 10 days 20 tablet 0    cefdinir (OMNICEF) 300 MG capsule Take 1 capsule by mouth 2 times daily for 10 days 20 capsule 0    mupirocin (BACTROBAN) 2 % ointment Apply topically 3 times daily.  30 g 0    omeprazole (PRILOSEC) 40 MG delayed release capsule TAKE 1 CAPSULE BY MOUTH 30 MINUTES BEFORE A MEAL DAILY 90 capsule 1    buPROPion (WELLBUTRIN SR) 200 MG extended release tablet Take 1 tablet by mouth once

## 2023-09-29 ASSESSMENT — ENCOUNTER SYMPTOMS
ABDOMINAL PAIN: 0
COUGH: 0
VOMITING: 0
DIARRHEA: 0
SHORTNESS OF BREATH: 0
SINUS PAIN: 0
NAUSEA: 0
WHEEZING: 0
CONSTIPATION: 0

## 2023-09-30 ENCOUNTER — PATIENT MESSAGE (OUTPATIENT)
Dept: INTERNAL MEDICINE | Age: 53
End: 2023-09-30

## 2023-09-30 DIAGNOSIS — R06.02 SOB (SHORTNESS OF BREATH): Primary | ICD-10-CM

## 2023-10-02 ENCOUNTER — OFFICE VISIT (OUTPATIENT)
Dept: PODIATRY | Age: 53
End: 2023-10-02
Payer: MEDICAID

## 2023-10-02 VITALS — BODY MASS INDEX: 30.07 KG/M2 | WEIGHT: 222 LBS | HEIGHT: 72 IN

## 2023-10-02 DIAGNOSIS — B35.3 TINEA PEDIS OF BOTH FEET: Primary | ICD-10-CM

## 2023-10-02 DIAGNOSIS — B35.1 ONYCHOMYCOSIS: ICD-10-CM

## 2023-10-02 PROCEDURE — 99213 OFFICE O/P EST LOW 20 MIN: CPT | Performed by: PODIATRIST

## 2023-10-02 NOTE — PROGRESS NOTES
10/2/23     Westley Kilgore    : 1970   Sex: male    Age: 46 y.o. Patient's PCP/Provider is:  Magnus Mosqueda DO    Subjective:  Patient is seen today for follow-up regarding continued care regarding nail dystrophy issues and chronic tinea pedis issues to both lower extremities. Patient has noticed continual improvement with topical medications being utilized. Patient has adjusted shoe gear as well to prevent continued symptoms. No other additional abnormalities noted. Chief Complaint   Patient presents with    Nail Problem     Nail care       ROS:  Const: Positives and pertinent negatives as per HPI. Musculo: Denies symptoms other than stated above. Neuro: Denies symptoms other than stated above. Skin: Denies symptoms other than stated above. Current Medications:    Current Outpatient Medications:     ciclopirox (PENLAC) 8 % solution, Apply topically daily to affected nails. , Disp: 6 mL, Rfl: 2    nystatin-triamcinolone (MYCOLOG II) 919374-6.4 UNIT/GM-% cream, Apply topically 2 times daily. , Disp: 60 g, Rfl: 3    mupirocin (BACTROBAN) 2 % ointment, Apply topically 3 times daily. , Disp: 30 g, Rfl: 0    omeprazole (PRILOSEC) 40 MG delayed release capsule, TAKE 1 CAPSULE BY MOUTH 30 MINUTES BEFORE A MEAL DAILY, Disp: 90 capsule, Rfl: 1    buPROPion (WELLBUTRIN SR) 200 MG extended release tablet, Take 1 tablet by mouth once, Disp: , Rfl:     PARoxetine (PAXIL) 10 MG tablet, Take 1 tablet by mouth every morning, Disp: , Rfl:     amLODIPine (NORVASC) 10 MG tablet, Take 1 tablet by mouth daily, Disp: 30 tablet, Rfl: 1    pentoxifylline (TRENTAL) 400 MG extended release tablet, TAKE ONE TABLET BY MOUTH TWO TIMES DAILY, Disp: , Rfl:     tadalafil (CIALIS) 5 MG tablet, , Disp: , Rfl:     azelastine (ASTELIN) 0.1 % nasal spray, , Disp: , Rfl:     zolpidem (AMBIEN) 10 MG tablet, Thru  in [facility], Disp: , Rfl:     bictegravir-emtricitab-tenofovir alafenamide (BIKTARVY) -25 MG TABS per <-- Click to add NO pertinent Family History

## 2023-10-02 NOTE — TELEPHONE ENCOUNTER
From: Westley Kilgore  To: Dr. Nelida Carmona: 9/30/2023 8:48 AM EDT  Subject: referral    Hi Doc,    Thank you so much for everything so far!! You're amazing and super patient! We discussed you possibly referring me to a cardiologist if I wanted. ..but I declined. Can you refer me to a cardiologist after-all? I'm embarrassed to ask this of you but I need to rule this out so I can focus on future endeavors without worry. I just have not been feeling well but I am too enbarrassed to say that after this past year. I have been feeling like my heart keeps skipping, shortness of breath even when laying down, my dizzy attacks continue and my BP is still high (diastolic average 86-61) and I am one a double does of medication to lower it. Let me know.     Stella Cordova  [phone]

## 2023-10-27 ENCOUNTER — PATIENT MESSAGE (OUTPATIENT)
Dept: INTERNAL MEDICINE | Age: 53
End: 2023-10-27

## 2023-10-27 DIAGNOSIS — R06.02 SOB (SHORTNESS OF BREATH): Primary | ICD-10-CM

## 2023-10-27 RX ORDER — CETIRIZINE HYDROCHLORIDE 10 MG/1
10 TABLET ORAL DAILY
Qty: 30 TABLET | Refills: 0 | Status: SHIPPED | OUTPATIENT
Start: 2023-10-27 | End: 2023-11-26

## 2023-10-27 RX ORDER — FLUTICASONE PROPIONATE 50 MCG
1 SPRAY, SUSPENSION (ML) NASAL DAILY
Qty: 16 G | Refills: 2 | Status: SHIPPED | OUTPATIENT
Start: 2023-10-27

## 2023-10-27 NOTE — TELEPHONE ENCOUNTER
From: Silvino Decker  To: Dr. Dunia Ellington: 10/27/2023 7:08 AM EDT  Subject: Allergies, Cardiologist Referral & updated cell #    Hi Doc,    Hope all is well! Finally, I'm starting to feel myself again. I work out every day so I'm hoping to lose some pounds soon! However, I still would like that Cardiologist referral.     I did not notice any referrals on this site. .. Karsten Mack however, my cell number is now back to my old # which was not listed. The correct cell # is [phone]    And, my allergies are the worst ever. Can you prescribe me some allergy medication? I'm buying benedril constantly but they are not that effective.

## 2023-11-02 ENCOUNTER — OFFICE VISIT (OUTPATIENT)
Dept: BARIATRICS/WEIGHT MGMT | Age: 53
End: 2023-11-02
Payer: MEDICAID

## 2023-11-02 VITALS
HEART RATE: 68 BPM | WEIGHT: 223.2 LBS | TEMPERATURE: 97.3 F | DIASTOLIC BLOOD PRESSURE: 92 MMHG | BODY MASS INDEX: 31.25 KG/M2 | SYSTOLIC BLOOD PRESSURE: 136 MMHG | HEIGHT: 71 IN

## 2023-11-02 DIAGNOSIS — E66.09 CLASS 1 OBESITY DUE TO EXCESS CALORIES WITH SERIOUS COMORBIDITY AND BODY MASS INDEX (BMI) OF 31.0 TO 31.9 IN ADULT: ICD-10-CM

## 2023-11-02 DIAGNOSIS — E78.2 MIXED HYPERLIPIDEMIA: Primary | ICD-10-CM

## 2023-11-02 PROCEDURE — 99417 PROLNG OP E/M EACH 15 MIN: CPT | Performed by: INTERNAL MEDICINE

## 2023-11-02 PROCEDURE — 3075F SYST BP GE 130 - 139MM HG: CPT | Performed by: INTERNAL MEDICINE

## 2023-11-02 PROCEDURE — 3080F DIAST BP >= 90 MM HG: CPT | Performed by: INTERNAL MEDICINE

## 2023-11-02 PROCEDURE — 99205 OFFICE O/P NEW HI 60 MIN: CPT | Performed by: INTERNAL MEDICINE

## 2023-11-02 RX ORDER — ZOLPIDEM TARTRATE 5 MG/1
TABLET ORAL
COMMUNITY
Start: 2023-10-24

## 2023-11-02 NOTE — PROGRESS NOTES
CC -   HTN, GERD, HLD, Obesity    BACKGROUND -   First visit: 11/2/23    Obesity (all weight in lbs)  Initial Ht 70.5\", Wt 223.2,  BMI 31.57  Began 2 yrs ago was 175 lbs and healthy - several incidents since and has gained since  HS Grad wt 165-170   Lowest   wt 165   Highest  wt 228 (pcp)  Pattern of wt gain: rapid  Wt change past yr: +20 lbs  Most wt lost: 40 lbs  Other diets attempted: Keto diet    Desire to lose weight: 10/10  (would like to be at about 175-180 lbs)    Initial Diet:    Number of meals per day - 1 (leftover for lunch)    Number of snacks per day - 4-5    Meal volume - 12\" plate,  +/- mostly seconds (later)    Fast food/convenience store - 0-1x/week    Restaurants (not fast food) - 0-1x/week   Sweets - ~5d/week (currently unable to control - coming for her mom from family members)   Chips - 0-1d/week   Crackers/pretzels - 1-2d/week   Nuts - 7d/week (picking on)   Peanut Butter - 0d/week (loves)   Popcorn - 0d/week (not currently)   Dried fruit - 1-2d/week (dried cranberry in salad)   Whole fruit - 3-5d/week (mostly frozen blueberries)   Breakfast cereal - 1-2d/week (oats, honey granola for yogurt)   Granola/Protein/Energy bar - 7d/week   Sugar sweetened beverages - no pop/soda, no fruit juice, coffee 2-3 cups/day with little bit of whole milk, no tea, no energy drinks. Water mostly. Protein - Protein powder (strawberry protein powder)   Fiber - No supplements    Wt effect of HR foods = 1750 Salas/wk = 250 Salas/d= 9.5% DEN = 26 lb/year. Initial Exercise:    Gym membership - PF - started back about 1.5 months ago - currently going daily - started with     Walking - 100-120 min of aerobic exercise    Running - no    Resistance - not started with weight weight yet    Aerobic class - see above     Sleep: Not very good.  Tries not to take Ambien, but takes when nothing else is helping sleep.   ______________________    [facility] [place] [facility]J -  Past Medical History:   Diagnosis Date    HIV (human immunodeficiency virus

## 2023-11-02 NOTE — PATIENT INSTRUCTIONS
Rules:  Count every calorie every day (you can use free harshil such as 'baritastic' or 'nutritionix track')  Limit sweets to one day per month  Limit chips/crackers/pretzels/nuts/popcorn to 150 salas/day  Eliminate all sugar sweetened beverages (including fruit juice)  Limit restaurants (including fast food and food from a convenience store) to one time every two weeks while in town    Requirements:  Make sure protein intake is at least 100 grams per day (do not count protein every day; instead spot check your intake every 2-3 weeks and make sure what you think you are getting is close to accurate; consider using a protein shake if needed; these are in the pharmacy section of the stores, not the grocery section; Premier, Pure Protein and Fairlife are relatively inexpensive and taste good to most patients; other options are Nectar, Boost Max, Ensure Max, BeneProtein and GNC lean (which is lactose-free); Nectar fruit, Premier Protein Clear, IsoPure Protein Drink, and Protein 2 O are water-based options; Quest (or Cosco, which is cheaper and is ordered on [employer]) and the Rockerbox protein bars can also be used, but have less protein in them ) (<200 Salas, >25 g protein) - (chicken, fish, turkey, egg white)  (Disclaimer: Dietary supplements rarely have their listed ingredients and the amount of each verified by a third party other. Sometimes they give verification for their claims to be GMO and gluten free and to be organic. However, even such verifications as these may still be untrustworthy.)  Make sure that fiber intake is at least 30 grams per day. Do this by either eating 15 tablespoons of the original, plain Fiber One cereal every day or 5 tablespoons of wheat dextrin powder (Benefiber or a generic brand) every day (or high fiber bread).  Work up to this amount slowly by starting with only one-eighth to one-fourth of the target amount and then adding another one-eighth to one-fourth every one or two weeks until reaching

## 2023-11-03 ENCOUNTER — TELEPHONE (OUTPATIENT)
Dept: ADMINISTRATIVE | Age: 53
End: 2023-11-03

## 2023-11-03 NOTE — TELEPHONE ENCOUNTER
.Patient Appointment Form:      PCP: Lenny Pham., DO  Referring: Lenny Pham., DO    Has the Patient:    Seen a Cardiologist? no    Had a heart catheterization? no    Had heart surgery? no    Had a stress test or nuclear stress test? no    Had an echocardiogram? no    Had a vascular ultrasound? no    Had a 24/48 heart monitor or extended cardiac event monitor? no    Had recent blood work in the last 6 months? yes    date: 8/3/2023    ordering physician: Dr. Rachael Mancuso    Had a pacemaker/ICD/ILR implant? no    Seen an Electrophysiologist? no        Will send records via: in [address] [place] [address]      Date & time of appointment:  11/22/2023 1:30 PM

## 2023-11-07 NOTE — TELEPHONE ENCOUNTER
Last Appointment:  9/28/2023  Future Appointments   Date Time Provider [address]   11/15/2023  1:00 PM Vesna Varner, DO ACC [facility]   11/22/2023  1:30 PM Anat Emanuel MD [place] [facility]   12/13/2023 12:45 PM Ryan Campos MD Surg Weight [facility]   12/22/2023 12:00 PM Porter Merlin., DPM [place] [facility] [facility]   1/3/2024  1:00 PM Vesna Varner, DO [facility]

## 2023-11-08 ENCOUNTER — TELEPHONE (OUTPATIENT)
Dept: BARIATRICS/WEIGHT MGMT | Age: 53
End: 2023-11-08

## 2023-11-08 ENCOUNTER — HOSPITAL ENCOUNTER (OUTPATIENT)
Age: 53
Discharge: HOME | End: 2023-11-08
Payer: MEDICAID

## 2023-11-08 LAB
FSH SERPL-ACNC: 1.6 MIU/ML
HCT VFR BLD AUTO: 44 % (ref 37–54)
HGB BLD-MCNC: 15 G/DL (ref 12.5–16.5)
LH SERPL-ACNC: 0.9 MIU/ML
PROLACTIN SERPL-MCNC: 14.69 NG/ML
PSA SERPL-MCNC: 1.77 NG/ML (ref 0–4)
TESTOST SERPL-MCNC: 36 NG/DL (ref 193–740)

## 2023-11-08 PROCEDURE — 84403 ASSAY OF TOTAL TESTOSTERONE: CPT

## 2023-11-08 PROCEDURE — 85014 HEMATOCRIT: CPT

## 2023-11-08 PROCEDURE — 84153 ASSAY OF PSA TOTAL: CPT

## 2023-11-08 PROCEDURE — 84146 ASSAY OF PROLACTIN: CPT

## 2023-11-08 PROCEDURE — 83001 ASSAY OF GONADOTROPIN (FSH): CPT

## 2023-11-08 PROCEDURE — 83002 ASSAY OF GONADOTROPIN (LH): CPT

## 2023-11-08 PROCEDURE — 85018 HEMOGLOBIN: CPT

## 2023-11-08 PROCEDURE — 36415 COLL VENOUS BLD VENIPUNCTURE: CPT

## 2023-11-08 PROCEDURE — 84270 ASSAY OF SEX HORMONE GLOBUL: CPT

## 2023-11-08 RX ORDER — AMLODIPINE BESYLATE 10 MG/1
10 TABLET ORAL DAILY
Qty: 30 TABLET | Refills: 0 | Status: SHIPPED | OUTPATIENT
Start: 2023-11-08

## 2023-11-09 LAB — SHBG SERPL-SCNC: 18 NMOL/L (ref 11–80)

## 2023-11-13 DIAGNOSIS — E66.09 CLASS 1 OBESITY DUE TO EXCESS CALORIES WITH SERIOUS COMORBIDITY AND BODY MASS INDEX (BMI) OF 31.0 TO 31.9 IN ADULT: ICD-10-CM

## 2023-11-13 DIAGNOSIS — E78.2 MIXED HYPERLIPIDEMIA: ICD-10-CM

## 2023-11-13 RX ORDER — PHENTERMINE AND TOPIRAMATE 3.75; 23 MG/1; MG/1
1 CAPSULE, EXTENDED RELEASE ORAL DAILY
Qty: 14 CAPSULE | Refills: 0 | Status: SHIPPED | OUTPATIENT
Start: 2023-11-13 | End: 2023-11-27

## 2023-11-13 RX ORDER — PHENTERMINE AND TOPIRAMATE 7.5; 46 MG/1; MG/1
1 CAPSULE, EXTENDED RELEASE ORAL DAILY
Qty: 30 CAPSULE | Refills: 1 | Status: SHIPPED | OUTPATIENT
Start: 2023-11-28 | End: 2023-12-28

## 2023-11-13 NOTE — PROGRESS NOTES
Qsymia:  Take Qsymia 3.75/23 mg, one tablet daily for two weeks; then increase the dose to 7.5/46 mg, one tablet daily from then on    While taking Qsymia, check the Blood Pressure every morning and every evening. If the systolic BP is >739 mmHg, the diastolic BP is >99 mm/Hg or the heart rate is > 100 beats per minute, do not take Qsymia that day. If the systolic BP is consistently >155 mmHg, the diastolic BP is consistently above 90 mm/Hg or the heart rate is consistently > 100 beats per minute, then stop taking Qsymia altogether (contact me for weaning instructions)    If the systolic BP >957 mmHg or the diastolic BP is >708 mmHg (even if it is only once), then Qsymia should be stopped altogether without proving that any of these are consistently elevated. Follow up in 2-3 months. Alejandra Hadley MD  Internal Medicine/Obesity Medicine  11/13/2023.

## 2023-11-15 ENCOUNTER — TELEPHONE (OUTPATIENT)
Dept: INTERNAL MEDICINE | Age: 53
End: 2023-11-15

## 2023-11-15 NOTE — TELEPHONE ENCOUNTER
----- Message from Tushar Givens sent at 11/15/2023  8:15 AM EST -----  Subject: Message to Provider    QUESTIONS  Information for Provider? Patient needs a Wellness visit and a Wellbeing   check up for insurance. Please call asap to discuss and schedule.   ---------------------------------------------------------------------------  --------------  Deisy Recinos [facility]  [phone]; OK to leave message on voicemail  ---------------------------------------------------------------------------  --------------  SCRIPT ANSWERS  Relationship to Patient?  Self

## 2023-11-22 ENCOUNTER — OFFICE VISIT (OUTPATIENT)
Dept: CARDIOLOGY CLINIC | Age: 53
End: 2023-11-22
Payer: MEDICAID

## 2023-11-22 VITALS
WEIGHT: 222 LBS | RESPIRATION RATE: 18 BRPM | BODY MASS INDEX: 31.08 KG/M2 | SYSTOLIC BLOOD PRESSURE: 102 MMHG | HEART RATE: 67 BPM | HEIGHT: 71 IN | DIASTOLIC BLOOD PRESSURE: 68 MMHG

## 2023-11-22 DIAGNOSIS — I10 PRIMARY HYPERTENSION: Primary | ICD-10-CM

## 2023-11-22 PROCEDURE — 93000 ELECTROCARDIOGRAM COMPLETE: CPT | Performed by: INTERNAL MEDICINE

## 2023-11-22 PROCEDURE — 3074F SYST BP LT 130 MM HG: CPT | Performed by: INTERNAL MEDICINE

## 2023-11-22 PROCEDURE — 3078F DIAST BP <80 MM HG: CPT | Performed by: INTERNAL MEDICINE

## 2023-11-22 PROCEDURE — 99203 OFFICE O/P NEW LOW 30 MIN: CPT | Performed by: INTERNAL MEDICINE

## 2023-11-22 ASSESSMENT — ENCOUNTER SYMPTOMS
WHEEZING: 0
CONSTIPATION: 0
DIARRHEA: 0
VOMITING: 0
SHORTNESS OF BREATH: 0
COUGH: 0
ABDOMINAL PAIN: 0
NAUSEA: 0
BLOOD IN STOOL: 0
BACK PAIN: 0

## 2023-11-22 NOTE — PROGRESS NOTES
is well-developed. He is obese. HENT:      Head: Normocephalic and atraumatic. Neck:      Vascular: No carotid bruit or JVD. Cardiovascular:      Rate and Rhythm: Normal rate and regular rhythm. Heart sounds: No murmur heard. No friction rub. No gallop. Pulmonary:      Breath sounds: Normal breath sounds. No wheezing or rales. Chest:      Chest wall: No tenderness. Abdominal:      General: Bowel sounds are normal. There is no distension. Palpations: Abdomen is soft. There is no mass. Tenderness: There is no abdominal tenderness. Comments: No abdominal bruit. Musculoskeletal:      Cervical back: Neck supple. Right lower leg: No edema. Left lower leg: No edema. Skin:     General: Skin is warm and dry. Neurological:      Mental Status: He is alert and oriented to person, place, and time.           Laboratory Tests:  Lab Results   Component Value Date    CREATININE 1.0 08/03/2023    BUN 16 08/03/2023     08/03/2023    K 4.2 08/03/2023     08/03/2023    CO2 25 08/03/2023     Lab Results   Component Value Date/Time    MG 2.1 04/08/2023 11:31 AM     Lab Results   Component Value Date    WBC 5.1 08/03/2023    HGB 15.0 11/08/2023    HCT 44.0 11/08/2023    MCV 91.5 08/03/2023     08/03/2023     Lab Results   Component Value Date    ALT 16 08/03/2023    AST 19 08/03/2023    ALKPHOS 28 (L) 08/03/2023    BILITOT 0.4 08/03/2023       Lab Results   Component Value Date    TSH 1.94 08/03/2023     Lab Results   Component Value Date    LABA1C 5.4 08/03/2023     Lab Results   Component Value Date    CHOL 179 04/05/2012    CHOL 180 11/12/2010     Lab Results   Component Value Date    TRIG 104 04/05/2012    TRIG 69 11/12/2010     Lab Results   Component Value Date    HDL 59 08/03/2023    HDL 58.0 04/05/2012    HDL 59.0 11/12/2010     Lab Results   Component Value Date    LDLCALC 100 (H) 04/05/2012    LDLCALC 107 (H) 11/12/2010    LDLCHOLESTEROL 151 (H) 08/03/2023

## 2023-11-29 DIAGNOSIS — F07.81 POST CONCUSSION SYNDROME: ICD-10-CM

## 2023-11-29 RX ORDER — CETIRIZINE HYDROCHLORIDE 10 MG/1
10 TABLET ORAL DAILY
Qty: 30 TABLET | Refills: 0 | Status: SHIPPED | OUTPATIENT
Start: 2023-11-29

## 2023-12-20 PROBLEM — F07.81 POST CONCUSSION SYNDROME: Status: ACTIVE | Noted: 2023-12-20

## 2023-12-20 PROBLEM — A53.9 ACQUIRED SYPHILIS: Status: RESOLVED | Noted: 2022-08-01 | Resolved: 2023-12-20

## 2023-12-29 ENCOUNTER — OFFICE VISIT (OUTPATIENT)
Dept: SURGERY | Age: 53
End: 2023-12-29
Payer: MEDICAID

## 2023-12-29 VITALS
TEMPERATURE: 98.3 F | BODY MASS INDEX: 30.23 KG/M2 | HEIGHT: 71 IN | DIASTOLIC BLOOD PRESSURE: 89 MMHG | OXYGEN SATURATION: 98 % | HEART RATE: 64 BPM | SYSTOLIC BLOOD PRESSURE: 142 MMHG | WEIGHT: 215.9 LBS

## 2023-12-29 DIAGNOSIS — K21.00 GASTROESOPHAGEAL REFLUX DISEASE WITH ESOPHAGITIS WITHOUT HEMORRHAGE: Primary | ICD-10-CM

## 2023-12-29 DIAGNOSIS — Z12.11 ENCOUNTER FOR SCREENING COLONOSCOPY: ICD-10-CM

## 2023-12-29 PROCEDURE — 3079F DIAST BP 80-89 MM HG: CPT | Performed by: STUDENT IN AN ORGANIZED HEALTH CARE EDUCATION/TRAINING PROGRAM

## 2023-12-29 PROCEDURE — 3077F SYST BP >= 140 MM HG: CPT | Performed by: STUDENT IN AN ORGANIZED HEALTH CARE EDUCATION/TRAINING PROGRAM

## 2023-12-29 PROCEDURE — 99204 OFFICE O/P NEW MOD 45 MIN: CPT | Performed by: STUDENT IN AN ORGANIZED HEALTH CARE EDUCATION/TRAINING PROGRAM

## 2023-12-29 RX ORDER — SODIUM CHLORIDE 9 MG/ML
25 INJECTION, SOLUTION INTRAVENOUS PRN
OUTPATIENT
Start: 2023-12-29

## 2023-12-29 RX ORDER — SODIUM CHLORIDE 0.9 % (FLUSH) 0.9 %
5-40 SYRINGE (ML) INJECTION PRN
OUTPATIENT
Start: 2023-12-29

## 2023-12-29 RX ORDER — SODIUM CHLORIDE, SODIUM LACTATE, POTASSIUM CHLORIDE, CALCIUM CHLORIDE 600; 310; 30; 20 MG/100ML; MG/100ML; MG/100ML; MG/100ML
INJECTION, SOLUTION INTRAVENOUS CONTINUOUS
OUTPATIENT
Start: 2023-12-29

## 2023-12-29 RX ORDER — SODIUM CHLORIDE 0.9 % (FLUSH) 0.9 %
5-40 SYRINGE (ML) INJECTION EVERY 12 HOURS SCHEDULED
OUTPATIENT
Start: 2023-12-29

## 2023-12-29 NOTE — PATIENT INSTRUCTIONS
with anticoagulant or blood-thinning properties   Prior abdominal surgery or radiation treatments   Active colitis , diverticulitis , or other acute bowel disease   Previous treatment with radiation therapy     Be sure to discuss these risks with your doctor before the procedure. What to Expect   Prior to Procedure   Your doctor will likely do the following:   Physical exam   Health history   Review of medicines   Test your stool for hidden blood (called \"occult blood\")     Your colon must be completely clean before the procedure. Any stool left in the intestine will block the view. This preparation may start several days before the procedure. Follow your doctor's instructions. Leading up to your procedure:   Talk to your doctor about your medicines. You may be asked to stop taking some medicines up to one week before the procedure, like:   Anti-inflammatory drugs (e.g., aspirin )   Blood thinners like clopidogrel (Plavix) or warfarin (Coumadin)   Iron supplements or vitamins containing iron   The day or days before your procedure, go on a clear liquid diet (clear broth, clear juice, clear jello) with no red coloring  Do not eat or drink anything after midnight. Wear comfortable clothing. If you have diabetes, ask your doctor if you need to adjust your diabetes medicine on the day prior to your procedure and the day of your procedure. Arrange for a ride home after the procedure. Anesthesia   You will receive intravenous sedation medicine for the procedure so you will not feel anything during the procedure. Description of the Procedure   You will lie on your left side with knees bent and drawn up toward your chest. The colonoscope will be slowly inserted through the rectum and into the bowel. The colonoscope will inject air into the colon. A small attached video camera will allow the doctor to view the colon's lining on a screen.  The doctor will continue guiding the tool through the bowel and

## 2023-12-29 NOTE — PROGRESS NOTES
44.0 11/08/2023 08:46 AM    MCV 91.5 08/03/2023 09:34 AM    MCH 31.6 08/03/2023 09:34 AM    MCHC 34.5 08/03/2023 09:34 AM    RDW 12.8 08/03/2023 09:34 AM     08/03/2023 09:34 AM    MPV 9.6 08/03/2023 09:34 AM     CMP:    Lab Results   Component Value Date/Time     08/03/2023 09:34 AM    K 4.2 08/03/2023 09:34 AM    K 5.2 04/08/2023 11:31 AM     08/03/2023 09:34 AM    CO2 25 08/03/2023 09:34 AM    BUN 16 08/03/2023 09:34 AM    CREATININE 1.0 08/03/2023 09:34 AM    LABGLOM >60 08/03/2023 09:34 AM    GLUCOSE 103 08/03/2023 09:34 AM    GLUCOSE 119 04/05/2012 12:45 PM    PROT 7.3 08/03/2023 09:34 AM    LABALBU 4.4 08/03/2023 09:34 AM    LABALBU 4.3 04/05/2012 12:45 PM    CALCIUM 9.6 08/03/2023 09:34 AM    BILITOT 0.4 08/03/2023 09:34 AM    ALKPHOS 28 08/03/2023 09:34 AM    AST 19 08/03/2023 09:34 AM    ALT 16 08/03/2023 09:34 AM     PT/INR:  No results found for: \"PROTIME\", \"INR\"  HgBA1c:    Lab Results   Component Value Date/Time    LABA1C 5.4 08/03/2023 09:34 AM     LIPASE:  No results found for: \"LIPASE\"           I have examined the patient and performed the key aspects of physical exam, and reviewed the record (including laboratory findings, results, and all pertinent radiology images, which are independently reviewed and interpreted unless otherwise explicitly stated). The referring provider's notes were also reviewed. Any procedures planned or discussed as above had risks, benefits, and reasonable alternatives thoroughly discussed with the patient or responsible party. Assessment/Plan:      Diagnosis Orders   1. Gastroesophageal reflux disease with esophagitis without hemorrhage        2. Encounter for screening colonoscopy          - He is due for a screening colonoscopy.  He also needs an EGd for his daily GERD    I have explained the risks, benefits, alternatives, and potential complications associated with the proposed procedure to be performed and other issues when applicable with the

## 2024-01-04 ENCOUNTER — PATIENT MESSAGE (OUTPATIENT)
Dept: INTERNAL MEDICINE | Age: 54
End: 2024-01-04

## 2024-01-04 DIAGNOSIS — F07.81 POST CONCUSSION SYNDROME: Primary | ICD-10-CM

## 2024-01-05 ENCOUNTER — TELEPHONE (OUTPATIENT)
Dept: SURGERY | Age: 54
End: 2024-01-05

## 2024-01-05 NOTE — TELEPHONE ENCOUNTER
Prior Authorization Form:      DEMOGRAPHICS:                     Patient Name:  Matt Pires  Patient :  1970            Insurance:  [place] / [place] / Product Type: *No Product type* /   Insurance ID Number:    Payer/Plan Subscr  Sex Relation Sub. Ins. ID Effective Group Num   1. [employer] MATT PIRES 1970 Male Employee [id number]                                    [address]   2. [employer]* MATT PIRES 1970 Male Self [id number]                                   [address] [address]         DIAGNOSIS & PROCEDURE:                       Procedure/Operation: EGD, colonoscopy           CPT Code: 17137, 67490    Diagnosis:  Reflux, screening    ICD10 Code: K21.9, Z12.11    Location:  [place]    Surgeon:  Dr Baker    SCHEDULING INFORMATION:                          Date: 24    Time: 12:45pm              Anesthesia:  MAC/TIVA                                                       Status:  Outpatient        Special Comments:  N/A       Electronically signed by Verónica Briggs MA on 2024 at 7:26 AM

## 2024-01-05 NOTE — TELEPHONE ENCOUNTER
Scheduled patient for EGD, screening colonoscopy on 1/23/24 @ 12:45pm at [facility]. Patient needs to report at the front entrance 1 hour before the procedure, NPO after the midnight the night before the procedure. No Qsymia for 7 days before the the procedure. Patient advised to STOP taking his mother's Rybelsus per Dr Baker. Dr Baker is unable to instruct him on this medication prior to procedure since it is not his medication. Patient verbalized understanding. Instruction letter mailed. Encouraged to call our office if any questions.    Electronically signed by Verónica Briggs MA on 1/5/2024 at 1:50 PM

## 2024-01-08 RX ORDER — AMITRIPTYLINE HYDROCHLORIDE 25 MG/1
25 TABLET, FILM COATED ORAL NIGHTLY
Qty: 30 TABLET | Refills: 0 | Status: SHIPPED | OUTPATIENT
Start: 2024-01-08

## 2024-01-08 NOTE — TELEPHONE ENCOUNTER
Ordered and referral palced     Electronically signed by Jignesh Smart Jr, DO on 1/8/2024 at 10:05 AM

## 2024-01-09 ENCOUNTER — TELEPHONE (OUTPATIENT)
Dept: INTERNAL MEDICINE | Age: 54
End: 2024-01-09

## 2024-01-09 NOTE — TELEPHONE ENCOUNTER
Patient called requesting a neurologist for memory issues. Patient stated that he is starting to stutter when he is nervous and that his thoughts are getting \"mixed up\". Patient wants to see a neurologist in [place].  Patient does not want to see the concussion specialist. Please advise.

## 2024-01-09 NOTE — TELEPHONE ENCOUNTER
----- Message from Matt Keenan sent at 1/8/2024  5:01 PM EST -----  Regarding: blood type  Contact: [phone]  The reason why I want to see a neurologist is because I’m worried about possible early cognitive issues.  I have been worried for a few years and then I got a concussion. Concussions magnify conditions you already have so my pcs could be magnifying something else I might already have.  Maybe I’m going crazy…

## 2024-01-09 NOTE — TELEPHONE ENCOUNTER
Left message for patient to call Internal Medicine concerning [facility] appointment to Neurology. Patient to see a Dr. Pamela White on 1/26/24 at 2pm. Address is [address] [place] [address]. Phone [phone], Fax [phone]. Left message for patient to call office for instructions and directions.

## 2024-01-10 ENCOUNTER — OFFICE VISIT (OUTPATIENT)
Dept: BARIATRICS/WEIGHT MGMT | Age: 54
End: 2024-01-10
Payer: MEDICAID

## 2024-01-10 VITALS
HEIGHT: 71 IN | WEIGHT: 210 LBS | BODY MASS INDEX: 29.4 KG/M2 | DIASTOLIC BLOOD PRESSURE: 93 MMHG | SYSTOLIC BLOOD PRESSURE: 132 MMHG | TEMPERATURE: 97.3 F | HEART RATE: 74 BPM

## 2024-01-10 DIAGNOSIS — E88.819 INSULIN RESISTANCE: ICD-10-CM

## 2024-01-10 DIAGNOSIS — E78.2 MIXED HYPERLIPIDEMIA: Primary | ICD-10-CM

## 2024-01-10 DIAGNOSIS — E66.09 CLASS 1 OBESITY DUE TO EXCESS CALORIES WITH SERIOUS COMORBIDITY AND BODY MASS INDEX (BMI) OF 31.0 TO 31.9 IN ADULT: ICD-10-CM

## 2024-01-10 PROCEDURE — 3080F DIAST BP >= 90 MM HG: CPT | Performed by: INTERNAL MEDICINE

## 2024-01-10 PROCEDURE — 3075F SYST BP GE 130 - 139MM HG: CPT | Performed by: INTERNAL MEDICINE

## 2024-01-10 PROCEDURE — 99211 OFF/OP EST MAY X REQ PHY/QHP: CPT

## 2024-01-10 PROCEDURE — 99215 OFFICE O/P EST HI 40 MIN: CPT | Performed by: INTERNAL MEDICINE

## 2024-01-10 RX ORDER — DULAGLUTIDE 1.5 MG/.5ML
1.5 INJECTION, SOLUTION SUBCUTANEOUS WEEKLY
Qty: 4 ADJUSTABLE DOSE PRE-FILLED PEN SYRINGE | Refills: 1 | Status: SHIPPED | OUTPATIENT
Start: 2024-02-07

## 2024-01-10 RX ORDER — DULAGLUTIDE 0.75 MG/.5ML
0.75 INJECTION, SOLUTION SUBCUTANEOUS WEEKLY
Qty: 4 ADJUSTABLE DOSE PRE-FILLED PEN SYRINGE | Refills: 0 | Status: SHIPPED | OUTPATIENT
Start: 2024-01-10 | End: 2024-02-06

## 2024-01-10 NOTE — PROGRESS NOTES
CC -   Follow up of: HTN, GERD, HLD, Obesity    BACKGROUND -   Last visit: 11/2/23  First visit: 11/2/23    Obesity (all weight in lbs)  Initial Ht 70.5\", Wt 223.2,  BMI 31.57  Began 2 yrs ago was 175 lbs and healthy - several incidents since and has gained since  HS Grad wt 165-170   Lowest   wt 165   Highest  wt 228 (pcp)  Pattern of wt gain: rapid  Wt change past yr: +20 lbs  Most wt lost: 40 lbs  Other diets attempted: Keto diet    Desire to lose weight: 10/10  (would like to be at about 175-180 lbs)    Initial Diet:    Number of meals per day - 1 (leftover for lunch)    Number of snacks per day - 4-5    Meal volume - 12\" plate,  +/- mostly seconds (later)    Fast food/convenience store - 0-1x/week    Restaurants (not fast food) - 0-1x/week   Sweets - ~5d/week (currently unable to control - coming for her mom from family members)   Chips - 0-1d/week   Crackers/pretzels - 1-2d/week   Nuts - 7d/week (picking on)   Peanut Butter - 0d/week (loves)   Popcorn - 0d/week (not currently)   Dried fruit - 1-2d/week (dried cranberry in salad)   Whole fruit - 3-5d/week (mostly frozen blueberries)   Breakfast cereal - 1-2d/week (oats, honey granola for yogurt)   Granola/Protein/Energy bar - 7d/week   Sugar sweetened beverages - no pop/soda, no fruit juice, coffee 2-3 cups/day with little bit of whole milk, no tea, no energy drinks. Water mostly.   Protein - Protein powder (strawberry protein powder)   Fiber - No supplements    Wt effect of HR foods = 1750 Salas/wk = 250 Salas/d= 9.5% DEN = 26 lb/year.     Initial Exercise:    Gym membership - PF - started back about 1.5 months ago - currently going daily - started with     Walking - 100-120 min of aerobic exercise    Running - no    Resistance - not started with weight weight yet    Aerobic class - see above     Sleep: Not very good. Tries not to take Ambien, but takes when nothing else is helping sleep.   ______________________    [facility] -  Past Medical History:   Diagnosis Date

## 2024-01-10 NOTE — PATIENT INSTRUCTIONS
Rules:  Count every calorie every day (you can use free harshil such as 'baritastic' or 'nutritionix track')  Limit sweets to one day per month  Limit chips/crackers/pretzels/nuts/popcorn to 150 salas/day  Eliminate all sugar sweetened beverages (including fruit juice)  Limit restaurants (including fast food and food from a convenience store) to one time every two weeks while in town    Requirements:  Make sure protein intake is at least 100 grams per day (do not count protein every day; instead spot check your intake every 2-3 weeks and make sure what you think you are getting is close to accurate; consider using a protein shake if needed; these are in the pharmacy section of the stores, not the grocery section; Premier, Pure Protein and Fairlife are relatively inexpensive and taste good to most patients; other options are Nectar, Boost Max, Ensure Max, BeneProtein and [employer]NC lean (which is lactose-free);   Nectar fruit, Premier Protein Clear, IsoPure Protein Drink, and Protein 2 O are water-based options; [employer] (or [employer], which is cheaper and is ordered on Amazon) and the [employer] protein bars can also be used, but have less protein in them ) (<200 Salas, >25 g protein) - (chicken, fish, turkey, egg white)  (Disclaimer: Dietary supplements rarely have their listed ingredients and the amount of each verified by a third party other. Sometimes they give verification for their claims to be GMO and gluten free and to be organic. However, even such verifications as these may still be untrustworthy.)  Make sure that fiber intake is at least 30 grams per day. Do this by either eating 15 tablespoons of the original, plain Fiber One cereal every day or 5 tablespoons of wheat dextrin powder (Benefiber or a generic brand) every day (or high fiber bread). Work up to this amount slowly by starting with only one-eighth to one-fourth of the target amount and then adding another one-eighth to one-fourth every one or two weeks until reaching

## 2024-01-16 ENCOUNTER — HOSPITAL ENCOUNTER (OUTPATIENT)
Age: 54
Discharge: HOME | End: 2024-01-16
Payer: MEDICAID

## 2024-01-16 DIAGNOSIS — F07.81 POST CONCUSSION SYNDROME: ICD-10-CM

## 2024-01-16 LAB — ABO + RH BLD: NORMAL

## 2024-01-16 PROCEDURE — 86901 BLOOD TYPING SEROLOGIC RH(D): CPT

## 2024-01-16 PROCEDURE — 86900 BLOOD TYPING SEROLOGIC ABO: CPT

## 2024-02-26 ENCOUNTER — TELEPHONE (OUTPATIENT)
Dept: SURGERY | Age: 54
End: 2024-02-26

## 2024-02-26 NOTE — TELEPHONE ENCOUNTER
Pt called in to speak with the office. He has a colonoscopy liborio for 3/4/24. His medication has changed, he needs to know which medication he is allowed to take and which medications he isn't allowed. He lost the paper with the instructions for the procedure. Matt can be reached at [phone]. Thank you

## 2024-02-27 NOTE — TELEPHONE ENCOUNTER
MA discussed medications with patient. Patient to  new instructions sheet today with documentation of no Trulicity or Phentermine-Topiramate 7 days before the procedure. Patient verbalized understanding.    Electronically signed by Verónica Briggs MA on 2/27/2024 at 10:56 AM      Patient presented to office to  instruction sheet for EGD/Colonoscopy. Patient requested to discuss new medications. Indu Sanchez MA and myself were present to review new medications with patient. Patient had entered 3 new medications on MyChart but the only one not showing in Epic was Strattera. MA advised patient PAT would instruct him as to whether this would need to be held prior to procedure. Patient verbalized understanding.    Electronically signed by Verónica Briggs MA on 2/28/2024 at 8:44 AM

## 2024-03-14 LAB
HCT VFR BLD AUTO: 44.2 % (ref 37–54)
HGB BLD-MCNC: 14.8 G/DL (ref 12.5–16.5)
TESTOST SERPL-MCNC: 291 NG/DL (ref 193–740)

## 2024-03-22 ENCOUNTER — OFFICE VISIT (OUTPATIENT)
Dept: PODIATRY | Age: 54
End: 2024-03-22
Payer: MEDICAID

## 2024-03-22 VITALS — HEIGHT: 71 IN | WEIGHT: 210 LBS | BODY MASS INDEX: 29.4 KG/M2

## 2024-03-22 DIAGNOSIS — M20.42 HAMMER TOES OF BOTH FEET: ICD-10-CM

## 2024-03-22 DIAGNOSIS — M20.41 HAMMER TOES OF BOTH FEET: ICD-10-CM

## 2024-03-22 DIAGNOSIS — R26.2 DIFFICULTY WALKING: ICD-10-CM

## 2024-03-22 DIAGNOSIS — B35.3 TINEA PEDIS OF BOTH FEET: Primary | ICD-10-CM

## 2024-03-22 DIAGNOSIS — B35.1 ONYCHOMYCOSIS: ICD-10-CM

## 2024-03-22 PROCEDURE — 99213 OFFICE O/P EST LOW 20 MIN: CPT | Performed by: PODIATRIST

## 2024-03-22 NOTE — PROGRESS NOTES
Patient here for nail care. Jignesh Smart Jr., DO   Electronically signed by Lily Marcus LPN on 3/22/2024 at 12:03 PM    
evaluation and management.      Seen By:    Kentrell Ackerman Jr, DPM    Electronically signed by Kentrell Ackerman Jr, DPM on 3/22/2024 at 12:20 PM    This note was created using voice recognition software.  The note was reviewed however may contain grammatical errors.

## 2024-04-03 ENCOUNTER — OFFICE VISIT (OUTPATIENT)
Dept: INTERNAL MEDICINE | Age: 54
End: 2024-04-03
Payer: MEDICAID

## 2024-04-03 VITALS
OXYGEN SATURATION: 97 % | SYSTOLIC BLOOD PRESSURE: 130 MMHG | RESPIRATION RATE: 16 BRPM | HEART RATE: 78 BPM | DIASTOLIC BLOOD PRESSURE: 85 MMHG | HEIGHT: 71 IN | WEIGHT: 204.5 LBS | BODY MASS INDEX: 28.63 KG/M2 | TEMPERATURE: 97.4 F

## 2024-04-03 DIAGNOSIS — F32.A DEPRESSION, UNSPECIFIED DEPRESSION TYPE: Primary | ICD-10-CM

## 2024-04-03 DIAGNOSIS — I10 PRIMARY HYPERTENSION: ICD-10-CM

## 2024-04-03 DIAGNOSIS — K21.00 GASTROESOPHAGEAL REFLUX DISEASE WITH ESOPHAGITIS WITHOUT HEMORRHAGE: ICD-10-CM

## 2024-04-03 PROCEDURE — 3075F SYST BP GE 130 - 139MM HG: CPT | Performed by: INTERNAL MEDICINE

## 2024-04-03 PROCEDURE — 99213 OFFICE O/P EST LOW 20 MIN: CPT | Performed by: INTERNAL MEDICINE

## 2024-04-03 PROCEDURE — 99212 OFFICE O/P EST SF 10 MIN: CPT | Performed by: INTERNAL MEDICINE

## 2024-04-03 PROCEDURE — 3079F DIAST BP 80-89 MM HG: CPT | Performed by: INTERNAL MEDICINE

## 2024-04-03 RX ORDER — BUPROPION HYDROCHLORIDE 300 MG/1
300 TABLET ORAL EVERY MORNING
COMMUNITY

## 2024-04-03 RX ORDER — AMLODIPINE BESYLATE 10 MG/1
10 TABLET ORAL NIGHTLY
Qty: 90 TABLET | Refills: 3 | Status: SHIPPED | OUTPATIENT
Start: 2024-04-03

## 2024-04-03 ASSESSMENT — PATIENT HEALTH QUESTIONNAIRE - PHQ9
10. IF YOU CHECKED OFF ANY PROBLEMS, HOW DIFFICULT HAVE THESE PROBLEMS MADE IT FOR YOU TO DO YOUR WORK, TAKE CARE OF THINGS AT HOME, OR GET ALONG WITH OTHER PEOPLE: SOMEWHAT DIFFICULT
SUM OF ALL RESPONSES TO PHQ QUESTIONS 1-9: 21
6. FEELING BAD ABOUT YOURSELF - OR THAT YOU ARE A FAILURE OR HAVE LET YOURSELF OR YOUR FAMILY DOWN: NEARLY EVERY DAY
10. IF YOU CHECKED OFF ANY PROBLEMS, HOW DIFFICULT HAVE THESE PROBLEMS MADE IT FOR YOU TO DO YOUR WORK, TAKE CARE OF THINGS AT HOME, OR GET ALONG WITH OTHER PEOPLE: SOMEWHAT DIFFICULT
4. FEELING TIRED OR HAVING LITTLE ENERGY: NEARLY EVERY DAY
9. THOUGHTS THAT YOU WOULD BE BETTER OFF DEAD, OR OF HURTING YOURSELF: NOT AT ALL
3. TROUBLE FALLING OR STAYING ASLEEP: NEARLY EVERY DAY
SUM OF ALL RESPONSES TO PHQ QUESTIONS 1-9: 21
5. POOR APPETITE OR OVEREATING: NEARLY EVERY DAY
7. TROUBLE CONCENTRATING ON THINGS, SUCH AS READING THE NEWSPAPER OR WATCHING TELEVISION: NEARLY EVERY DAY
SUM OF ALL RESPONSES TO PHQ QUESTIONS 1-9: 21
5. POOR APPETITE OR OVEREATING: NEARLY EVERY DAY
SUM OF ALL RESPONSES TO PHQ QUESTIONS 1-9: 21
3. TROUBLE FALLING OR STAYING ASLEEP: NEARLY EVERY DAY
SUM OF ALL RESPONSES TO PHQ9 QUESTIONS 1 & 2: 6
4. FEELING TIRED OR HAVING LITTLE ENERGY: NEARLY EVERY DAY
8. MOVING OR SPEAKING SO SLOWLY THAT OTHER PEOPLE COULD HAVE NOTICED. OR THE OPPOSITE, BEING SO FIGETY OR RESTLESS THAT YOU HAVE BEEN MOVING AROUND A LOT MORE THAN USUAL: NOT AT ALL
6. FEELING BAD ABOUT YOURSELF - OR THAT YOU ARE A FAILURE OR HAVE LET YOURSELF OR YOUR FAMILY DOWN: NEARLY EVERY DAY
7. TROUBLE CONCENTRATING ON THINGS, SUCH AS READING THE NEWSPAPER OR WATCHING TELEVISION: NEARLY EVERY DAY
1. LITTLE INTEREST OR PLEASURE IN DOING THINGS: NEARLY EVERY DAY
8. MOVING OR SPEAKING SO SLOWLY THAT OTHER PEOPLE COULD HAVE NOTICED. OR THE OPPOSITE - BEING SO FIDGETY OR RESTLESS THAT YOU HAVE BEEN MOVING AROUND A LOT MORE THAN USUAL: NOT AT ALL
1. LITTLE INTEREST OR PLEASURE IN DOING THINGS: NEARLY EVERY DAY
9. THOUGHTS THAT YOU WOULD BE BETTER OFF DEAD, OR OF HURTING YOURSELF: NOT AT ALL
2. FEELING DOWN, DEPRESSED OR HOPELESS: NEARLY EVERY DAY
2. FEELING DOWN, DEPRESSED OR HOPELESS: NEARLY EVERY DAY
SUM OF ALL RESPONSES TO PHQ QUESTIONS 1-9: 21

## 2024-04-03 ASSESSMENT — ENCOUNTER SYMPTOMS
CONSTIPATION: 0
WHEEZING: 0
ABDOMINAL PAIN: 0
NAUSEA: 0
DIARRHEA: 0
SHORTNESS OF BREATH: 0
COUGH: 0
SINUS PAIN: 0
VOMITING: 0

## 2024-04-03 ASSESSMENT — COLUMBIA-SUICIDE SEVERITY RATING SCALE - C-SSRS
2. IN THE PAST MONTH, HAVE YOU ACTUALLY HAD ANY THOUGHTS OF KILLING YOURSELF?: NO
1. IN THE PAST MONTH, HAVE YOU WISHED YOU WERE DEAD OR WISHED YOU COULD GO TO SLEEP AND NOT WAKE UP?: YES
6. IN YOUR LIFETIME, HAVE YOU EVER DONE ANYTHING, STARTED TO DO ANYTHING, OR PREPARED TO DO ANYTHING TO END YOUR LIFE?: NO

## 2024-04-03 NOTE — PROGRESS NOTES
effort is normal. No respiratory distress.      Breath sounds: No decreased breath sounds, wheezing, rhonchi or rales.   Abdominal:      General: Bowel sounds are normal.      Palpations: Abdomen is soft. There is no mass.      Tenderness: There is no abdominal tenderness. There is no guarding.   Neurological:      Mental Status: He is alert.            ASSESSMENT/PLAN:    HTN  -controlled; continue amlodipine     Obesity  -patinet taking GLP1 per bariatrics   -continue care per their recs     Deprssion  -denies SI/HI  -denies AH/VH  -continue current treatment per psychaitry recs     RTC:  Return in about 6 months (around 10/3/2024).      I have reviewed my findings and recommendations with Matt Smart Jr, DO   4/3/2024 4:24 PM

## 2024-04-08 ENCOUNTER — TELEPHONE (OUTPATIENT)
Dept: INTERNAL MEDICINE | Age: 54
End: 2024-04-08

## 2024-04-15 ENCOUNTER — OFFICE VISIT (OUTPATIENT)
Dept: BARIATRICS/WEIGHT MGMT | Age: 54
End: 2024-04-15
Payer: MEDICAID

## 2024-04-15 VITALS
WEIGHT: 202.4 LBS | DIASTOLIC BLOOD PRESSURE: 78 MMHG | TEMPERATURE: 97.9 F | HEIGHT: 71 IN | HEART RATE: 78 BPM | SYSTOLIC BLOOD PRESSURE: 117 MMHG | BODY MASS INDEX: 28.34 KG/M2

## 2024-04-15 DIAGNOSIS — E78.2 MIXED HYPERLIPIDEMIA: Primary | ICD-10-CM

## 2024-04-15 DIAGNOSIS — E66.09 CLASS 1 OBESITY DUE TO EXCESS CALORIES WITH SERIOUS COMORBIDITY AND BODY MASS INDEX (BMI) OF 31.0 TO 31.9 IN ADULT: ICD-10-CM

## 2024-04-15 DIAGNOSIS — E88.819 INSULIN RESISTANCE: ICD-10-CM

## 2024-04-15 PROCEDURE — 99211 OFF/OP EST MAY X REQ PHY/QHP: CPT

## 2024-04-15 PROCEDURE — 3078F DIAST BP <80 MM HG: CPT | Performed by: INTERNAL MEDICINE

## 2024-04-15 PROCEDURE — 99214 OFFICE O/P EST MOD 30 MIN: CPT | Performed by: INTERNAL MEDICINE

## 2024-04-15 PROCEDURE — 3074F SYST BP LT 130 MM HG: CPT | Performed by: INTERNAL MEDICINE

## 2024-04-15 RX ORDER — DULAGLUTIDE 3 MG/.5ML
3 INJECTION, SOLUTION SUBCUTANEOUS WEEKLY
Qty: 4 ADJUSTABLE DOSE PRE-FILLED PEN SYRINGE | Refills: 2 | Status: SHIPPED | OUTPATIENT
Start: 2024-04-15

## 2024-04-15 NOTE — PATIENT INSTRUCTIONS
Rules:  Limit sweets to one day per month  Limit chips/crackers/pretzels/nuts/popcorn to 150 sedrick/day  Eliminate all sugar sweetened beverages (including fruit juice)  Limit restaurants (including fast food and food from a convenience store) to one time every two weeks while in town    Requirements:  Make sure protein intake is at least 100 grams per day  Make sure that fiber intake is at least 30 grams per day  Take one multivitamin every day    Targets:  Limit calorie intake to 1800 calories/day  Walk 30 minutes daily  Avoid eating 2 hours within bedtime.     Tips:  Do not eat outside of the dining room or the kitchen  Do not eat while watching TV, videos, working on the computer or using a smart phone  Do not eat food out of a multi-serving bag or container.    Qsymia:  While taking Qsymia, check the Blood Pressure every morning and every evening.     If the systolic BP is >155 mmHg, the diastolic BP is >90 mm/Hg or the heart rate is > 100 beats per minute, do not take Qsymia that day.    If the systolic BP is consistently >155 mmHg, the diastolic BP is consistently above 90 mm/Hg or the heart rate is consistently > 100 beats per minute, then stop taking Qsymia altogether (contact me for weaning instructions)    If the systolic BP >170 mmHg or the diastolic BP is >100 mmHg (even if it is only once), then Qsymia should be stopped altogether without proving that any of these are consistently elevated.    Trulicity:  Increase Trulicity to 3 mg under the skin weekly.      Follow up in 2-3 months.

## 2024-04-15 NOTE — PROGRESS NOTES
allergies.    Social history: smoking: no (tried socially in the past) ; Alcohol: socially about 1x/wk or less smt beer smt wine , work: not currently.    ROS -  Card - no CP  GI - no N/V/D/C (lately softer than normal)    PE -  Gen : /78 (Site: Left Upper Arm, Position: Sitting, Cuff Size: Large Adult)   Pulse 78   Temp 97.9 °F (36.6 °C) (Temporal)   Ht 1.791 m (5' 10.5\")   Wt 91.8 kg (202 lb 6.4 oz)   BMI 28.63 kg/m²    WN, WD, NAD  Lung: Nml resp effort, CTA B/L  Heart:  RRR w/o MGR, no LE pitting edema b/l  Psych: Normal mood   Full affect  Neuro: Moves all ext well  ______________________    HISTORY & ASSESSMENT/PLAN -     Problem 1  - Hyperlipidemia  HPI   - , SED866,  on 8/3/23, pt asymptomatic, not on medication.  Assessment  - Uncontrolled.  Plan   - Diet control, and needs rechecked in future. Weight reduction can help with hyperlipidemia, planned as follows    Problem 2  - Obesity   HPI   - See above Background for description    Weight  Date    223.2  11/2/23  Qsymia    210.0  1/10/24   Qsymia 11.25->15, Trulicity    202.4  4/15/24   (194->202.4) Trulicity 3    Total weight change to date: -20.8 lbs.  Average daily energy variance:  11/2/2023 - 1/10/2024: -11.6 lbs (75811 Salas)/68 d = -597 Salas/d deficit.  1/10/2024 - 4/15/2024: -7.6 lbs (49493 Salas)/96 d = -277 Salas/d deficit.     Patient's estimated daily energy need (DEN) = 2643 Salas/d = 93457 Salas/wk    Update:  Calorie monitoring: life is not so not regulated and unable to monitor, but trying to eat healthy  Sweets: ice cream ~1-2x/wk  SSB: none  Restaurant/Fast food: 2x/wk  Appetite suppressant: Qsymia 11.25/69 and then 15/92 -> reordered, and along with Trulicity, appetite suppression 10/10 when taking, but not taking every time,   Home BP monitoring: has been wnl, watching everyday.  Protein: No supplement, meat (egg salad)  Fiber: 2 gummies/day  Water: feels he is not drinking enough  Activities: went back to gym yesterday (will

## 2024-05-13 DIAGNOSIS — E66.09 CLASS 1 OBESITY DUE TO EXCESS CALORIES WITH SERIOUS COMORBIDITY AND BODY MASS INDEX (BMI) OF 31.0 TO 31.9 IN ADULT: Primary | ICD-10-CM

## 2024-05-13 DIAGNOSIS — E78.2 MIXED HYPERLIPIDEMIA: ICD-10-CM

## 2024-08-21 ENCOUNTER — TELEPHONE (OUTPATIENT)
Dept: PODIATRY | Age: 54
End: 2024-08-21

## 2024-08-21 DIAGNOSIS — B35.3 TINEA PEDIS OF BOTH FEET: ICD-10-CM

## 2024-10-16 DIAGNOSIS — E88.819 INSULIN RESISTANCE: ICD-10-CM

## 2024-10-20 RX ORDER — DULAGLUTIDE 3 MG/.5ML
INJECTION, SOLUTION SUBCUTANEOUS
Qty: 2 ML | Refills: 0 | OUTPATIENT
Start: 2024-10-20